# Patient Record
Sex: FEMALE | Race: WHITE | NOT HISPANIC OR LATINO | Employment: UNEMPLOYED | URBAN - METROPOLITAN AREA
[De-identification: names, ages, dates, MRNs, and addresses within clinical notes are randomized per-mention and may not be internally consistent; named-entity substitution may affect disease eponyms.]

---
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
## 2024-01-02 ENCOUNTER — TELEPHONE (OUTPATIENT)
Age: 19
End: 2024-01-02

## 2024-01-02 NOTE — TELEPHONE ENCOUNTER
Patient called as she was not given her montly apt she needs for her Accutane when she was at her [facility] appt. Advised her that I do not have anything but that I would forward a msg to the office letting them know that she needs an apt and then have someone from the office reach out to get her scheduled this month.

## 2024-01-22 ENCOUNTER — TELEMEDICINE (OUTPATIENT)
Dept: DERMATOLOGY | Facility: CLINIC | Age: 19
End: 2024-01-22
Payer: COMMERCIAL

## 2024-01-22 ENCOUNTER — TELEPHONE (OUTPATIENT)
Age: 19
End: 2024-01-22

## 2024-01-22 VITALS — HEIGHT: 63 IN | BODY MASS INDEX: 22.15 KG/M2 | WEIGHT: 125 LBS

## 2024-01-22 DIAGNOSIS — L70.0 ACNE VULGARIS: ICD-10-CM

## 2024-01-22 PROCEDURE — 99214 OFFICE O/P EST MOD 30 MIN: CPT | Performed by: DERMATOLOGY

## 2024-01-22 RX ORDER — ISOTRETINOIN 40 MG/1
CAPSULE ORAL
Qty: 30 CAPSULE | Refills: 0 | Status: SHIPPED | OUTPATIENT
Start: 2024-01-22

## 2024-01-22 NOTE — TELEPHONE ENCOUNTER
Patient called to say she had a VV apt with Dr Rasheed 12:50 keeps getting your call but there is no one on the  line when she answers. They called her again while I was on the line. Pt hung up to take the call

## 2024-01-22 NOTE — PROGRESS NOTES
Virtual Regular Visit    Verification of patient location:    Patient is located at Other in the following state in which I hold an active license [place]      Assessment/Plan:    Problem List Items Addressed This Visit    None           Reason for visit is   Chief Complaint   Patient presents with    Virtual Regular Visit          Encounter provider Tia Freedman MD    Provider located at [place] [address]  [place] [address]  [phone]      Recent Visits  No visits were found meeting these conditions.  Showing recent visits within past 7 days and meeting all other requirements  Today's Visits  Date Type Provider Dept   01/22/24 Telemedicine Tia Fredeman MD [facility]   Showing today's visits and meeting all other requirements  Future Appointments  No visits were found meeting these conditions.  Showing future appointments within next 150 days and meeting all other requirements       The patient was identified by name and date of birth. Jeri Thomas was informed that this is a telemedicine visit and that the visit is being conducted through the [employer] platform. She agrees to proceed..  My office door was closed. No one else was in the room.  She acknowledged consent and understanding of privacy and security of the video platform. The patient has agreed to participate and understands they can discontinue the visit at any time.    Patient is aware this is a billable service.     Subjective  Jeri Thomas is a 18 y.o. female Accutane follow up .      HPI     Past Medical History:   Diagnosis Date    Acne vulgaris        Past Surgical History:   Procedure Laterality Date    ADENOIDECTOMY      TONSILLECTOMY         Current Outpatient Medications   Medication Sig Dispense Refill    albuterol (Ventolin HFA) 90 mcg/act inhaler Inhale 2 puffs every 6 (six) hours as needed for wheezing      azelastine (ASTELIN) 0.1 % nasal  "spray 1 spray into each nostril 2 (two) times a day Use in each nostril as directed 30 mL 1    ISOtretinoin (ACCUTANE) 40 MG capsule Take 1 capsule (40 mg total) by mouth daily. Do NOT drink alcohol, share medication or donate blood. Do not become pregnant. Ipledge #: [id number] 30 capsule 0     No current facility-administered medications for this visit.        No Known Allergies    Review of Systems    Video Exam    Vitals:    01/22/24 1309   Weight: 56.7 kg (125 lb)   Height: 5' 3\" (1.6 m)       Physical Exam     Visit Time  Total Visit Duration 10      [facility] Note     Patient Name: Jeri Semmes  Encounter Date: 1/22/24     Have you been cared for by a [facility] Dermatologist in the last 3 years and, if so, which description applies to you?    Yes.  I have been here within the last 3 years, and my medical history has NOT changed since that time.  I am FEMALE/of child-bearing potential.    REVIEW OF SYSTEMS:  Have you recently had or currently have any of the following? No changes in my recent health.   PAST MEDICAL HISTORY:  Have you personally ever had or currently have any of the following?  If \"YES,\" then please provide more detail. No changes in my medical history.   HISTORY OF IMMUNOSUPPRESSION: Do you have a history of any of the following:  Systemic Immunosuppression such as Diabetes, Biologic or Immunotherapy, Chemotherapy, Organ Transplantation, Bone Marrow Transplantation?  No     Answering \"YES\" requires the addition of the dotphrase \"IMMUNOSUPPRESSED\" as the first diagnosis of the patient's visit.   FAMILY HISTORY:  Any \"first degree relatives\" (parent, brother, sister, or child) with the following?    No changes in my family's known health.   PATIENT EXPERIENCE:    Do you want the Dermatologist to perform a COMPLETE skin exam today including a clinical examination under the \"bra and underwear\" areas?  NO  If necessary, do we have your permission to call and leave a detailed " "message on your Preferred Phone number that includes your specific medical information?  Yes      No Known Allergies   Current Outpatient Medications:     albuterol (Ventolin HFA) 90 mcg/act inhaler, Inhale 2 puffs every 6 (six) hours as needed for wheezing, Disp: , Rfl:     azelastine (ASTELIN) 0.1 % nasal spray, 1 spray into each nostril 2 (two) times a day Use in each nostril as directed, Disp: 30 mL, Rfl: 1    ISOtretinoin (ACCUTANE) 40 MG capsule, Take 1 capsule (40 mg total) by mouth daily. Do NOT drink alcohol, share medication or donate blood. Do not become pregnant. Ipledge #: [id number], Disp: 30 capsule, Rfl: 0          Whom besides the patient is providing clinical information about today's encounter?   NO ADDITIONAL HISTORIAN (patient alone provided history)    Physical Exam and Assessment/Plan by Diagnosis:      ISOTRETINOIN \"ACCUTANE\": FEMALE    Age: 18 y.o.  Weight (in KILOgrams): 56.7 kg      Ipledge number: [id number]       Contraception Type 1: implantable hormone  Contraception Type 2: male latex condom  Patient reminded that this must be listed in same order on iPledge. Yes       \"Goal Dose\" in mg (125 mg x weight in kg): 7087.5 mg    Interim month  \"Daily Dose\" of Isotretinoin the patient has actually been taking since last visit (this is usually what was prescribed): 40 mg  \"Total # of Days\" patient took Isotretinoin since last visit (this is usually 30):  32 days  \"Total Monthly Dose\" in mg since last visit (this equals \"Daily Dose\" x \"Total # of Days\"): 1,280 mg    Cumulative dosage  \"Total cumulative Dose\" in mg (add the above \"Total Monthly Dose\" with \"Total Cumulative Dose\" from last visit: 6,890 mg        Symptoms  Conjunctivitis: No  Night Blindness/ Issues with night vision: No  Focusing Problems: No  Dry Lips/Eyes: YES, both, lips worse  Dry Skin: YES  Rash: No  Nose Bleeds: No  Angular cheilitis (cracking in corner of lips): YES  Headaches: No  Photosensitivity: No  Dry Anus: " "No  Depression: No  Mood Changes: No  Suicidal thoughts: No  Fatigue: No  Weight Loss: No  Muscle Pain/Stiffness: No  Abdominal pain: No  Diarrhea: No  Bloody stools: No    Physical Exam  Psychiatric/Mood: Normal  Anatomic Location Affected:  Face currently clear  Morphological Description:  Open/Closed Comedones:  No evidence (\"Clear\")  Inflammatory Papules/Pustules:  No evidence (\"Clear\")  Nodules:  No evidence (\"Clear\")  Scarring:  No evidence (\"Clear\")  Excoriations:  No evidence (\"Clear\")  Local Skin Redness/Erythema:  Few (\"Mild\")  Local Skin Dryness/Scaling:  Several (\"Moderate\")  Local Skin Dyspigmentation:  No evidence (\"Clear\")  Pertinent Positives:  Pertinent Negatives:    Labs  Date of last labs: 11/22/23  Completed: YES  Labs reviewed: within normal limits  Pregnancy test: urine pregnancy at home because of COVID 19  Result: negative  Interpretation- pregnant: No      Additional History of Present Condition:  Doing well this month. Acne free for about a month. Xerosis only. Denies headache, visual change, GI symptoms, mood change or depression, new muscle or bone pain. Aaron gaps in her 2 forms of birth control.    DIAGNOSES:    Acne Vulgaris  High Risk Medication  Medication Monitoring  Xerosis (see below for patient education)    Assessment and Plan:  Target Total Dose per KILOgram:  125 mg/KILOgram (this may change this on a patient-by-patient basis)  Planned daily dose for next 30 days: 40 mg  (please remember to add patient's \"Ipledge number\" to actual prescription):  [id number]  Return to clinic in about 1 month, please review ipledge guidelines in terms of timing (see below for patient education)  Get labs 1-2 days before next appointment: no  Patient confirmed in iPledge: yes  Patients counseled:  Cannot donate blood while taking isotretinoin and for 1 month after completing therapy. YES  Do not share medication with anyone. YES  Possible side effects discussed, including sun sensitivity, dry " lips/eyes/skin, headaches, blurry vision (pseudotumor cerebri), muscle/joint aches, GI upset, bloody stools (“IBD” including Crohn’s/Ulcerative Colitis), jaundice, liver dysfunction, lipid abnormalities, bone marrow dysfunction, mood effects, thoughts of hurting oneself or others, and flaring of acne (acne fulminans/SAPPHO). YES  Females cannot get pregnant and must be on two forms of birth control while on therapy and at least one month after. YES  Report any side effects of mood swings or depression and stop medication upon occurrence. YES  Patient needs to confirm counseling in iPledge prior to picking up prescription. YES      Isotretinoin for Acne   Isotretinoin, a derivative of vitamin A, is a retinoid medication that is taken by mouth to treat severe nodular acne. Typically, it is used once other acne treatments have not worked, such as oral antibiotics. Isotretinoin is powerful drug with several significant potential side effects. It used to be sold under the brand name “Accutane” but now several versions exist. Usually isotretinoin is taken for 4 to 6 months, although the length of treatment can vary from person to person.  While most patient’s acne improves and may even clear with this medication, in 20% of patients acne can come back. This requires additional acne treatment or even a second cycle of isotretinoin.     How should I take isotretinoin?   Isotretinoin dosing is weight-based and should be taken exactly as prescribed.    If you miss a dose, skip that dose. Do not take 2 doses at the same time.    Take with food to help with absorption.  All instructions in the iPLEDGE program packet (www.[employer]) that was provided must be followed (see below for highlights).   You will get a 30 day supply of isotretinoin at a time. It cannot be automatically refilled.  Make certain that you have been given enough medication to last 30 days as pharmacists are unable to refill or make changes within a  month.  You must return to your dermatologist every month to make sure you are not having any serious side effects from isotretinoin. For female patients, this visit will always include a monthly pregnancy test. Other laboratory studies, including liver function tests, cholesterol and triglycerides, must also be conducted before and during treatment.     What should I avoid while taking isotretinoin?   Do not get pregnant from one month prior or 1 month following taking any isotretinoin.   Do not donate blood while take isotretinoin or until 1 months after coming off the medication.   Do not have cosmetic procedures to smooth your skin, including waxing, dermabrasion, or laser procedures, while taking this medication and for at least 6 months after you stop.    Do not share isotretinoin with any other people. It can cause birth defects and other serious health problems.   Do not use any other acne medications, including medicated washes, cleansers, creams or antibiotic pills during your treatment with isotretinoin unless expressly directed to by your dermatologist.     Initiating isotretinoin & the iPLEDGE Program   The iPLEDGE Program is a strict, government-required program to prevent females from becoming pregnant while on isotretinoin. All females and males must participate. Note: Your provider must follow this program and cannot change any of the requirements.   Before starting isotretinoin, your provider will talk to you about the safe use of this medication and you will need to sign consent forms in order to receive treatment.    If you fail to keep appointments, you will be unable to get your prescription filled.     For females of childbearing age:      You must be on two specific forms of birth control before starting isotretinoin. Your provider must get 2 negative pregnancy tests, at least 30 days apart, before you can proceed with the medication. The second pregnancy test must be obtained within 5 days  of the menstrual cycle. If you choose not to be sexually active during treatment, you still must have the 2 negative pregnancy tests.   You must answer a series of questions either online or by phone every month prior to getting the prescription.  Monthly prescriptions must be filled within 7 days of that month's pregnancy test.  It is important that you notify your physician well before the 7th day if there are any unforeseen delays, such as prior authorizations.  For more information, visit: [email or web address]    What are the possible side effects of isotretinoin? What should I do?   Most side effects from isotretinoin are mild and can be easily improved with simple remedies.  Others are more concerning.   Dry skin and eyes, chapped lips and dry nose that may lead to nosebleeds.    Dry Skin: Apply sensitive skin moisturizers to dry skin at least two times a day. You may need sunscreen (SPF 30) in the morning and to reapply when outside.    Dry Eyes: Use saline eye drops or artificial tears.    Dry Nose/Nosebleeds: Use saline nasal spray (ex. Ayr) during the day and at bedtime. To stop nosebleeds, hold pressure.  If this does not work, call your dermatologist.    Chapped lips: apply petrolatum-based lip balms routinely. Avoid anything “medicated.” Contact your dermatologist for excessive dryness, cracks, tenderness or pain.   Increased blood fats and cholesterol (usually in patients with a personal or family history of cholesterol or triglyceride problems).    Vision problems affecting your ability to see in the dark and drive at night.   Bone, muscle and tendon aches can occur. Additional stretching before and after activities may help relieve aches.  If you are otherwise healthy, consider the use of ibuprofen or naproxen.  If you are unsure if you can use these pain medications, ask your doctor first. Also, call your doctor if you experience severe back pain, joint pain, or a  "broken bone   Changes in mood, including anxiety, depressive symptoms or suicidal thoughts which may or may not be temporary.  Notify your doctor if your or a family member have suffered from these conditions or if you have any concerns during treatment.   Serious birth defects or miscarriage can occur while taking this medication and for one month after taking your last dose of isotretinoin. You must not get pregnant while taking isotretinoin. Once the medication is out of your system, 30 days, there is no effect on the baby.   Increased pressure in the brain. Call your doctor right away if you experience bad headache, blurred vision, dizziness, nausea or vomiting, or seizures.   Skin rash - call your doctor right away if you develop any rashes or blisters on the skin.   Liver damage - call your doctor right away if you experience severe stomach, chest or bowel pain, painful swallowing, diarrhea, blood in your stool, yellowing of your skin or eyes, or dark urine.   Gastrointestinal bleeding. If you experience unusual abdominal pain or red or black/tarry stools, call your doctor immediately. You should also notify your doctor if you or a family member has a history of ulcerative colitis or Crohns disease.   Worsening acne. Mild worsening of acne can occur in the first few weeks of using isotreinoin. If your acne is getting significantly worse, call your doctor. This may require temporarily stopping the isotretinoin and possibly adding other medications.           XEROSIS (\"DRY SKIN\")    Dry skin refers to skin that feels dry to touch. Dry skin has a dull surface with a rough, scaly quality. The skin is less pliable and cracked. When dryness is severe, the skin may become inflamed and fissured.  Although any body site can be dry, dry skin tends to affect the shins more than any other site.    Dry skin is lacking moisture in the outer horny cell layer (stratum corneum) and this results in cracks in the skin " surface.  Dry skin is also called xerosis, xeroderma or asteatosis (lack of fat).  It can affect males and females of all ages. There is some racial variability in water and lipid content of the skin.  Dry skin that starts in early childhood may be one of about 20 types of ichthyosis (fish-scale skin). There is often a family history of dry skin.   Dry skin is commonly seen in people with atopic dermatitis.  Nearly everyone > 60 years has dry skin.    Dry skin that begins later may be seen in people with certain diseases and conditions.  Postmenopausal women  Hypothyroidism  Chronic renal disease   Malnutrition and weight loss   Subclinical dermatitis   Treatment with certain drugs such as oral retinoids, diuretics and epidermal growth factor receptor inhibitors    People exposed to a dry environment may experience dry skin.  Low humidity: in desert climates or cool, windy conditions   Excessive air conditioning   Direct heat from a fire or fan heater   Excessive bathing   Contact with soap, detergents and solvents   Inappropriate topical agents such as alcohol   Frictional irritation from rough clothing or abrasives    Dry skin is due to abnormalities in the integrity of the barrier function of the stratum corneum, which is made up of corneocytes.  There is an overall reduction in the lipids in the stratum corneum.   Ratio of ceramides, cholesterol and free fatty acids may be normal or altered.   There may be a reduction in the proliferation of keratinocytes.   Keratinocyte subtypes change in dry skin with a decrease in keratins K1, K10 and increase in K5, K14.   Involucrin (a protein) may be expressed early, increasing cell stiffness.   The result is retention of corneocytes and reduced water-holding capacity.    What is the treatment for dry skin?  The mainstay of treatment of dry skin and ichthyosis is moisturisers/emollients. They should be applied liberally and often enough to:  Reduce itch   Improve the  barrier function   Prevent entry of irritants, bacteria   Reduce transepidermal water loss.    When considering which emollient is most suitable, consider:  Severity of the dryness   Tolerance   Personal preference   Cost and availability.    Emollients generally work best if applied to damp skin, if pH is below 7 (acidic), and if containing humectants such as urea or propylene glycol.  Additional treatments include:  Topical steroid if itchy or there is dermatitis -- choose an emollient base   Topical calcineurin inhibitors if topical steroids are unsuitable.    How can dry skin be prevented?  Eliminate aggravating factors:  Reduce the frequency of bathing.   A humidifier in winter and air conditioner in summer   Compare having a short shower with a prolonged soak in a bath.   Use lukewarm, not hot, water.   Replace standard soap with a substitute such as a synthetic detergent cleanser, water-miscible emollient, bath oil, anti-pruritic tar oil, colloidal oatmeal etc.   Apply an emollient liberally and often, particularly shortly after bathing, and when itchy. The drier the skin, the thicker this should be, especially on the hands.    What is the outlook for dry skin?  A tendency to dry skin may persist life-long, or it may improve once contributing factors are controlled.

## 2024-02-14 ENCOUNTER — TELEPHONE (OUTPATIENT)
Age: 19
End: 2024-02-14

## 2024-02-14 NOTE — TELEPHONE ENCOUNTER
r/s from 2/21/24 Dr. Lagunas, left v/m with new appt info and asked for pt to c/b to confirm or r/s if needed

## 2024-02-22 ENCOUNTER — TELEMEDICINE (OUTPATIENT)
Dept: DERMATOLOGY | Facility: CLINIC | Age: 19
End: 2024-02-22

## 2024-02-22 DIAGNOSIS — L70.0 ACNE VULGARIS: Primary | ICD-10-CM

## 2024-02-22 DIAGNOSIS — L70.0 ACNE VULGARIS: ICD-10-CM

## 2024-02-22 NOTE — PROGRESS NOTES
Virtual Regular Visit    Verification of patient location:    Patient is located at Home in the following state in which I hold an active license [place]      Assessment/Plan:    Problem List Items Addressed This Visit    None           Reason for visit is   Chief Complaint   Patient presents with    Virtual Regular Visit          Encounter provider ZULEYMA Musa    Provider located at [facility] [place]  [address]  [address]  [place] [address] [phone]      Recent Visits  No visits were found meeting these conditions.  Showing recent visits within past 7 days and meeting all other requirements  Today's Visits  Date Type Provider Dept   02/22/24 Telemedicine ZULEYMA Musa  [facility] [place]   Showing today's visits and meeting all other requirements  Future Appointments  No visits were found meeting these conditions.  Showing future appointments within next 150 days and meeting all other requirements       The patient was identified by name and date of birth. Jeri Thomas was informed that this is a telemedicine visit and that the visit is being conducted through the Epic Embedded platform. She agrees to proceed..  My office door was closed. No one else was in the room.  She acknowledged consent and understanding of privacy and security of the video platform. The patient has agreed to participate and understands they can discontinue the visit at any time.    Patient is aware this is a billable service.     Subjective  Jeri Thomas is a 18 y.o. female with a history of acne.      HPI     Past Medical History:   Diagnosis Date    Acne vulgaris        Past Surgical History:   Procedure Laterality Date    ADENOIDECTOMY      TONSILLECTOMY         Current Outpatient Medications   Medication Sig Dispense Refill    albuterol (Ventolin HFA) 90 mcg/act inhaler Inhale 2 puffs every 6 (six) hours as needed for wheezing      azelastine (ASTELIN) 0.1 %  "nasal spray 1 spray into each nostril 2 (two) times a day Use in each nostril as directed 30 mL 1    ISOtretinoin (ACCUTANE) 40 MG capsule Take 1 capsule (40 mg total) by mouth daily. Do NOT drink alcohol, share medication or donate blood. Do not become pregnant. Ipledge #: [id number] 30 capsule 0     No current facility-administered medications for this visit.        No Known Allergies    Review of Systems    Video Exam    There were no vitals filed for this visit.    Physical Exam     Visit Time  Total Visit Duration: 15 minutes    [facility] Note     Patient Name: Jeri Newton Hamilton  Encounter Date: 2/22/2024     Have you been cared for by a [facility] D[facility]ologist in the last 3 years and, if so, which description applies to you?    Yes.  I have been here within the last 3 years, and my medical history has NOT changed since that time.  I am FEMALE/of child-bearing potential.    REVIEW OF SYSTEMS:  Have you recently had or currently have any of the following? No changes in my recent health.   PAST MEDICAL HISTORY:  Have you personally ever had or currently have any of the following?  If \"YES,\" then please provide more detail. No changes in my medical history.   HISTORY OF IMMUNOSUPPRESSION: Do you have a history of any of the following:  Systemic Immunosuppression such as Diabetes, Biologic or Immunotherapy, Chemotherapy, Organ Transplantation, Bone Marrow Transplantation?  No     Answering \"YES\" requires the addition of the dotphrase \"IMMUNOSUPPRESSED\" as the first diagnosis of the patient's visit.   FAMILY HISTORY:  Any \"first degree relatives\" (parent, brother, sister, or child) with the following?    No changes in my family's known health.   PATIENT EXPERIENCE:    If necessary, do we have your permission to call and leave a detailed message on your Preferred Phone number that includes your specific medical information?  Yes      No Known Allergies   Current Outpatient Medications:     albuterol " "(Ventolin HFA) 90 mcg/act inhaler, Inhale 2 puffs every 6 (six) hours as needed for wheezing, Disp: , Rfl:     azelastine (ASTELIN) 0.1 % nasal spray, 1 spray into each nostril 2 (two) times a day Use in each nostril as directed, Disp: 30 mL, Rfl: 1    ISOtretinoin (ACCUTANE) 40 MG capsule, Take 1 capsule (40 mg total) by mouth daily. Do NOT drink alcohol, share medication or donate blood. Do not become pregnant. Ipledge #: [id number], Disp: 30 capsule, Rfl: 0          Whom besides the patient is providing clinical information about today's encounter?   NO ADDITIONAL HISTORIAN (patient alone provided history)    Physical Exam and Assessment/Plan by Diagnosis:    ISOTRETINOIN \"ACCUTANE\": FEMALE    Age: 18 y.o.  Weight (in KILOgrams): 56.7 kg    Ipledge number: [id number]     Contraception Type 1: implantable hormone  Contraception Type 2: male latex condom  Patient reminded that this must be listed in same order on iPledge. Yes       \"Goal Dose\" in mg (125 mg x weight in kg): 7087.5 mg    Interim month  \"Daily Dose\" of Isotretinoin the patient has actually been taking since last visit (this is usually what was prescribed): 40 mg  \"Total # of Days\" patient took Isotretinoin since last visit (this is usually 30):  31 days  \"Total Monthly Dose\" in mg since last visit (this equals \"Daily Dose\" x \"Total # of Days\"): 1240 mg    Cumulative dosage  \"Total cumulative Dose\" in mg (add the above \"Total Monthly Dose\" with \"Total Cumulative Dose\" from last visit: 8130 mg        Symptoms  Conjunctivitis: YES  Night Blindness/ Issues with night vision: No  Focusing Problems: No  Dry Lips/Eyes: YES  Dry Skin: YES  Rash: No  Nose Bleeds: No  Angular cheilitis (cracking in corner of lips): YES  Headaches: No  Photosensitivity: No  Dry Anus: No  Depression: No  Mood Changes: No  Suicidal thoughts: No  Fatigue: No  Weight Loss: No  Muscle Pain/Stiffness: No  Abdominal pain: No  Diarrhea: No  Bloody stools: No    Physical " "Exam  Psychiatric/Mood: Normal  Anatomic Location Affected:  face  Morphological Description:  Open/Closed Comedones:  Rare (\"Almost Clear\")  Inflammatory Papules/Pustules:  No evidence (\"Clear\")  Nodules:  No evidence (\"Clear\")  Scarring:  Few (\"Mild\")  Excoriations:  Rare (\"Almost Clear\")  Local Skin Redness/Erythema:  Few (\"Mild\")  Local Skin Dryness/Scaling:  Few (\"Mild\")  Local Skin Dyspigmentation:  No evidence (\"Clear\")  Pertinent Positives:  Pertinent Negatives:    Labs  Date of last labs: 11/22/2023  Completed: YES  Labs reviewed: within normal limits  Pregnancy test: urine pregnancy at home because of COVID 19  Result: Patient will perform pregnancy test and send picture to confirm  Interpretation- pregnant:       Additional History of Present Condition:  Patient last evaluated via virtual visit on 1/22/24.  She reports improvement, experiencing xerosis and dry eyes.  Applies aquaphor as needed.  Tolerating medication well.    DIAGNOSES:    Acne Vulgaris  High Risk Medication  Medication Monitoring  Xerosis (see below for patient education)    Assessment and Plan:  Target Total Dose per KILOgram:  125 mg/KILOgram (this may change this on a patient-by-patient basis)  Planned daily dose for next 30 days: 40 mg.  Patient wishes to remain on same dosage  (please remember to add patient's \"Ipledge number\" to actual prescription):  [id number]   Return to clinic in about 1 month, please review ipledge guidelines in terms of timing (see below for patient education)  Get labs 1-2 days before next appointment: No  Patient confirmed in iPledge: YES, once pregnancy test results are received  Patients counseled:  Cannot donate blood while taking isotretinoin and for 1 month after completing therapy. YES  Do not share medication with anyone. YES  Possible side effects discussed, including sun sensitivity, dry lips/eyes/skin, headaches, blurry vision (pseudotumor cerebri), muscle/joint aches, GI upset, bloody stools " (“IBD” including Crohn’s/Ulcerative Colitis), jaundice, liver dysfunction, lipid abnormalities, bone marrow dysfunction, mood effects, thoughts of hurting oneself or others, and flaring of acne (acne fulminans/SAPPHO). YES  Females cannot get pregnant and must be on two forms of birth control while on therapy and at least one month after. YES  Report any side effects of mood swings or depression and stop medication upon occurrence. YES  Patient needs to confirm counseling in iPledge prior to picking up prescription. YES      Isotretinoin for Acne   Isotretinoin, a derivative of vitamin A, is a retinoid medication that is taken by mouth to treat severe nodular acne. Typically, it is used once other acne treatments have not worked, such as oral antibiotics. Isotretinoin is powerful drug with several significant potential side effects. It used to be sold under the brand name “Accutane” but now several versions exist. Usually isotretinoin is taken for 4 to 6 months, although the length of treatment can vary from person to person.  While most patient’s acne improves and may even clear with this medication, in 20% of patients acne can come back. This requires additional acne treatment or even a second cycle of isotretinoin.     How should I take isotretinoin?   Isotretinoin dosing is weight-based and should be taken exactly as prescribed.    If you miss a dose, skip that dose. Do not take 2 doses at the same time.    Take with food to help with absorption.  All instructions in the iPLEDGE program packet (www.[employer].com) that was provided must be followed (see below for highlights).   You will get a 30 day supply of isotretinoin at a time. It cannot be automatically refilled.  Make certain that you have been given enough medication to last 30 days as pharmacists are unable to refill or make changes within a month.  You must return to your dermatologist every month to make sure you are not having any serious side  effects from isotretinoin. For female patients, this visit will always include a monthly pregnancy test. Other laboratory studies, including liver function tests, cholesterol and triglycerides, must also be conducted before and during treatment.     What should I avoid while taking isotretinoin?   Do not get pregnant from one month prior or 1 month following taking any isotretinoin.   Do not donate blood while take isotretinoin or until 1 months after coming off the medication.   Do not have cosmetic procedures to smooth your skin, including waxing, dermabrasion, or laser procedures, while taking this medication and for at least 6 months after you stop.    Do not share isotretinoin with any other people. It can cause birth defects and other serious health problems.   Do not use any other acne medications, including medicated washes, cleansers, creams or antibiotic pills during your treatment with isotretinoin unless expressly directed to by your dermatologist.     Initiating isotretinoin & the iPLEDGE Program   The iPLEDGE Program is a strict, government-required program to prevent females from becoming pregnant while on isotretinoin. All females and males must participate. Note: Your provider must follow this program and cannot change any of the requirements.   Before starting isotretinoin, your provider will talk to you about the safe use of this medication and you will need to sign consent forms in order to receive treatment.    If you fail to keep appointments, you will be unable to get your prescription filled.     For females of childbearing age:      You must be on two specific forms of birth control before starting isotretinoin. Your provider must get 2 negative pregnancy tests, at least 30 days apart, before you can proceed with the medication. The second pregnancy test must be obtained within 5 days of the menstrual cycle. If you choose not to be sexually active during treatment, you still must have the 2  negative pregnancy tests.   You must answer a series of questions either online or by phone every month prior to getting the prescription.  Monthly prescriptions must be filled within 7 days of that month's pregnancy test.  It is important that you notify your physician well before the 7th day if there are any unforeseen delays, such as prior authorizations.  For more information, visit: [email or web address]    What are the possible side effects of isotretinoin? What should I do?   Most side effects from isotretinoin are mild and can be easily improved with simple remedies.  Others are more concerning.   Dry skin and eyes, chapped lips and dry nose that may lead to nosebleeds.    Dry Skin: Apply sensitive skin moisturizers to dry skin at least two times a day. You may need sunscreen (SPF 30) in the morning and to reapply when outside.    Dry Eyes: Use saline eye drops or artificial tears.    Dry Nose/Nosebleeds: Use saline nasal spray (ex. Ayr) during the day and at bedtime. To stop nosebleeds, hold pressure.  If this does not work, call your dermatologist.    Chapped lips: apply petrolatum-based lip balms routinely. Avoid anything “medicated.” Contact your dermatologist for excessive dryness, cracks, tenderness or pain.   Increased blood fats and cholesterol (usually in patients with a personal or family history of cholesterol or triglyceride problems).    Vision problems affecting your ability to see in the dark and drive at night.   Bone, muscle and tendon aches can occur. Additional stretching before and after activities may help relieve aches.  If you are otherwise healthy, consider the use of ibuprofen or naproxen.  If you are unsure if you can use these pain medications, ask your doctor first. Also, call your doctor if you experience severe back pain, joint pain, or a broken bone   Changes in mood, including anxiety, depressive symptoms or suicidal thoughts which may or may  "not be temporary.  Notify your doctor if your or a family member have suffered from these conditions or if you have any concerns during treatment.   Serious birth defects or miscarriage can occur while taking this medication and for one month after taking your last dose of isotretinoin. You must not get pregnant while taking isotretinoin. Once the medication is out of your system, 30 days, there is no effect on the baby.   Increased pressure in the brain. Call your doctor right away if you experience bad headache, blurred vision, dizziness, nausea or vomiting, or seizures.   Skin rash - call your doctor right away if you develop any rashes or blisters on the skin.   Liver damage - call your doctor right away if you experience severe stomach, chest or bowel pain, painful swallowing, diarrhea, blood in your stool, yellowing of your skin or eyes, or dark urine.   Gastrointestinal bleeding. If you experience unusual abdominal pain or red or black/tarry stools, call your doctor immediately. You should also notify your doctor if you or a family member has a history of ulcerative colitis or Crohns disease.   Worsening acne. Mild worsening of acne can occur in the first few weeks of using isotreinoin. If your acne is getting significantly worse, call your doctor. This may require temporarily stopping the isotretinoin and possibly adding other medications.           XEROSIS (\"DRY SKIN\")    Dry skin refers to skin that feels dry to touch. Dry skin has a dull surface with a rough, scaly quality. The skin is less pliable and cracked. When dryness is severe, the skin may become inflamed and fissured.  Although any body site can be dry, dry skin tends to affect the shins more than any other site.    Dry skin is lacking moisture in the outer horny cell layer (stratum corneum) and this results in cracks in the skin surface.  Dry skin is also called xerosis, xeroderma or asteatosis (lack of fat).  It can affect males and females of " all ages. There is some racial variability in water and lipid content of the skin.  Dry skin that starts in early childhood may be one of about 20 types of ichthyosis (fish-scale skin). There is often a family history of dry skin.   Dry skin is commonly seen in people with atopic dermatitis.  Nearly everyone > 60 years has dry skin.    Dry skin that begins later may be seen in people with certain diseases and conditions.  Postmenopausal women  Hypothyroidism  Chronic renal disease   Malnutrition and weight loss   Subclinical dermatitis   Treatment with certain drugs such as oral retinoids, diuretics and epidermal growth factor receptor inhibitors    People exposed to a dry environment may experience dry skin.  Low humidity: in desert climates or cool, windy conditions   Excessive air conditioning   Direct heat from a fire or fan heater   Excessive bathing   Contact with soap, detergents and solvents   Inappropriate topical agents such as alcohol   Frictional irritation from rough clothing or abrasives    Dry skin is due to abnormalities in the integrity of the barrier function of the stratum corneum, which is made up of corneocytes.  There is an overall reduction in the lipids in the stratum corneum.   Ratio of ceramides, cholesterol and free fatty acids may be normal or altered.   There may be a reduction in the proliferation of keratinocytes.   Keratinocyte subtypes change in dry skin with a decrease in keratins K1, K10 and increase in K5, K14.   Involucrin (a protein) may be expressed early, increasing cell stiffness.   The result is retention of corneocytes and reduced water-holding capacity.    What is the treatment for dry skin?  The mainstay of treatment of dry skin and ichthyosis is moisturisers/emollients. They should be applied liberally and often enough to:  Reduce itch   Improve the barrier function   Prevent entry of irritants, bacteria   Reduce transepidermal water loss.    When considering which  emollient is most suitable, consider:  Severity of the dryness   Tolerance   Personal preference   Cost and availability.    Emollients generally work best if applied to damp skin, if pH is below 7 (acidic), and if containing humectants such as urea or propylene glycol.  Additional treatments include:  Topical steroid if itchy or there is dermatitis -- choose an emollient base   Topical calcineurin inhibitors if topical steroids are unsuitable.    How can dry skin be prevented?  Eliminate aggravating factors:  Reduce the frequency of bathing.   A humidifier in winter and air conditioner in summer   Compare having a short shower with a prolonged soak in a bath.   Use lukewarm, not hot, water.   Replace standard soap with a substitute such as a synthetic detergent cleanser, water-miscible emollient, bath oil, anti-pruritic tar oil, colloidal oatmeal etc.   Apply an emollient liberally and often, particularly shortly after bathing, and when itchy. The drier the skin, the thicker this should be, especially on the hands.    What is the outlook for dry skin?  A tendency to dry skin may persist life-long, or it may improve once contributing factors are controlled.

## 2024-02-23 RX ORDER — ISOTRETINOIN 40 MG/1
CAPSULE ORAL
Qty: 30 CAPSULE | Refills: 0 | Status: SHIPPED | OUTPATIENT
Start: 2024-02-23

## 2024-02-23 NOTE — TELEPHONE ENCOUNTER
Received confirmation of negative pregnancy test on 2/22/24.  Patient confirmed in iPLEDGE by Dr. Hernandez.  Refill sent to pharmacy.

## 2024-03-25 ENCOUNTER — TELEMEDICINE (OUTPATIENT)
Dept: DERMATOLOGY | Facility: CLINIC | Age: 19
End: 2024-03-25
Payer: COMMERCIAL

## 2024-03-25 VITALS — HEIGHT: 63 IN | BODY MASS INDEX: 23.04 KG/M2 | WEIGHT: 130 LBS

## 2024-03-25 DIAGNOSIS — L70.0 ACNE VULGARIS: Primary | ICD-10-CM

## 2024-03-25 PROCEDURE — 99213 OFFICE O/P EST LOW 20 MIN: CPT | Performed by: DERMATOLOGY

## 2024-03-25 NOTE — PROGRESS NOTES
Virtual Regular Visit    Verification of patient location:    Patient is located at Home in the following state in which I hold an active license [place]      Assessment/Plan:    Problem List Items Addressed This Visit    None           Reason for visit is   Chief Complaint   Patient presents with    Virtual Regular Visit          Encounter provider Tia Freedman MD    Provider located at [place] [address]  [place] [address]  [phone]      Recent Visits  No visits were found meeting these conditions.  Showing recent visits within past 7 days and meeting all other requirements  Today's Visits  Date Type Provider Dept   03/25/24 Telemedicine Tia Freedman MD [facility]   Showing today's visits and meeting all other requirements  Future Appointments  No visits were found meeting these conditions.  Showing future appointments within next 150 days and meeting all other requirements       The patient was identified by name and date of birth. Jeri Thomas was informed that this is a telemedicine visit and that the visit is being conducted through the [employer] platform. She agrees to proceed..  My office door was closed. No one else was in the room.  She acknowledged consent and understanding of privacy and security of the video platform. The patient has agreed to participate and understands they can discontinue the visit at any time.    Patient is aware this is a billable service.     Subjective  Jeri Thomas is a 18 y.o. female  .      HPI     Past Medical History:   Diagnosis Date    Acne vulgaris        Past Surgical History:   Procedure Laterality Date    ADENOIDECTOMY      TONSILLECTOMY         Current Outpatient Medications   Medication Sig Dispense Refill    ISOtretinoin (ACCUTANE) 40 MG capsule Take 1 capsule (40 mg total) by mouth daily. Do NOT drink alcohol, share medication or donate blood. Do not become pregnant.  "Ipledge #: [id number] 30 capsule 0    albuterol (Ventolin HFA) 90 mcg/act inhaler Inhale 2 puffs every 6 (six) hours as needed for wheezing      azelastine (ASTELIN) 0.1 % nasal spray 1 spray into each nostril 2 (two) times a day Use in each nostril as directed 30 mL 1     No current facility-administered medications for this visit.        No Known Allergies    Review of Systems    Video Exam    There were no vitals filed for this visit.    Physical Exam     Visit Time  Total Visit Duration: 15[facility] D[facility]at[facility] Note     Patient Name: Jeri Tempe  Encounter Date: 3/25/24     Have you been cared for by a [facility] Dermatologist in the last 3 years and, if so, which description applies to you?    Yes.  I have been here within the last 3 years, and my medical history has NOT changed since that time.  I am FEMALE/of child-bearing potential.    REVIEW OF SYSTEMS:  Have you recently had or currently have any of the following? No changes in my recent health.   PAST MEDICAL HISTORY:  Have you personally ever had or currently have any of the following?  If \"YES,\" then please provide more detail. No changes in my medical history.   HISTORY OF IMMUNOSUPPRESSION: Do you have a history of any of the following:  Systemic Immunosuppression such as Diabetes, Biologic or Immunotherapy, Chemotherapy, Organ Transplantation, Bone Marrow Transplantation?  No     Answering \"YES\" requires the addition of the dotphrase \"IMMUNOSUPPRESSED\" as the first diagnosis of the patient's visit.   FAMILY HISTORY:  Any \"first degree relatives\" (parent, brother, sister, or child) with the following?    No changes in my family's known health.   PATIENT EXPERIENCE:    Do you want the Dermatologist to perform a COMPLETE skin exam today including a clinical examination under the \"bra and underwear\" areas?  NO  If necessary, do we have your permission to call and leave a detailed message on your Preferred Phone number that includes your " "specific medical information?  Yes      No Known Allergies   Current Outpatient Medications:     ISOtretinoin (ACCUTANE) 40 MG capsule, Take 1 capsule (40 mg total) by mouth daily. Do NOT drink alcohol, share medication or donate blood. Do not become pregnant. Ipledge #: [id number], Disp: 30 capsule, Rfl: 0    albuterol (Ventolin HFA) 90 mcg/act inhaler, Inhale 2 puffs every 6 (six) hours as needed for wheezing, Disp: , Rfl:     azelastine (ASTELIN) 0.1 % nasal spray, 1 spray into each nostril 2 (two) times a day Use in each nostril as directed, Disp: 30 mL, Rfl: 1          Whom besides the patient is providing clinical information about today's encounter?   NO ADDITIONAL HISTORIAN (patient alone provided history)    Physical Exam and Assessment/Plan by Diagnosis:        ISOTRETINOIN \"ACCUTANE\": FEMALE     Age: 18 y.o.  Weight (in KILOgrams): 59 kg     Ipledge number: [id number]      Contraception Type 1: implantable hormone  Contraception Type 2: male latex condom  Patient reminded that this must be listed in same order on iPledge. Yes         \"Goal Dose\" in mg (125 mg x weight in kg): 7087.5 mg      Interim month  \"Daily Dose\" of Isotretinoin the patient has actually been taking since last visit (this is usually what was prescribed): 40 mg  \"Total # of Days\" patient took Isotretinoin since last visit (this is usually 30):  30  \"Total Monthly Dose\" in mg since last visit (this equals \"Daily Dose\" x \"Total # of Days\"): 1200     Cumulative dosage  \"Total cumulative Dose\" in mg (add the above \"Total Monthly Dose\" with \"Total Cumulative Dose\" from last visit: 9,330 mg           Symptoms  Conjunctivitis: no  Night Blindness/ Issues with night vision: No  Focusing Problems: No  Dry Lips/Eyes: YES  Dry Skin: YES  Rash: No  Nose Bleeds: No  Angular cheilitis (cracking in corner of lips): YES  Headaches: No  Photosensitivity: No  Dry Anus: No  Depression: No  Mood Changes: No  Suicidal thoughts: No  Fatigue: No  Weight " "Loss: No  Muscle Pain/Stiffness: No  Abdominal pain: No  Diarrhea: No  Bloody stools: No     Physical Exam  Psychiatric/Mood: Normal  Anatomic Location Affected:  face  Morphological Description:  Open/Closed Comedones:  None  Inflammatory Papules/Pustules:  No evidence (\"Clear\")  Nodules:  No evidence (\"Clear\")  Scarring:  Few (\"Mild\")  Excoriations:  Rare (\"Almost Clear\")  Local Skin Redness/Erythema:  Few (\"Mild\")  Local Skin Dryness/Scaling:  Few (\"Mild\")  Local Skin Dyspigmentation:  No evidence (\"Clear\")  Pertinent Positives:  Pertinent Negatives:     Labs  Date of last labs: 11/22/2023  Completed: YES  Labs reviewed: within normal limits  Pregnancy test PENDING: urine pregnancy at home because of COVID 19  Result: Patient will perform pregnancy test and send picture to confirm          Additional History of Present Condition:  Patient last evaluated via virtual visit on 2/22/24.  She has not had a new lesion in over a month and is past her goal dose. Denies headache, visual change, depression, abd pain, n/v/d. She is very happy with results. She denies gaps in her birth control (skin implant and condoms)     DIAGNOSES:       Acne Vulgaris  High Risk Medication  Medication Monitoring  Xerosis (see below for patient education)     Assessment and Plan: acne free for >1 month and comfortably over goal dose range. Will STOP Accutane. Patient to send photo of pregnancy test now, and again in 30 days to complete Ipledge requirements.  Target Total Dose per KILOgram:  125 mg/KILOgram (this may change this on a patient-by-patient basis)  Planned daily dose for next 30 days: 0 mg  (please remember to add patient's \"Ipledge number\" to actual prescription):  [id number]     Patient discontinued in iPledge: Will complete when pregnancy test results are received  Patients counseled:  Cannot donate blood while taking isotretinoin and for 1 month after completing therapy. YES  Do not share medication with anyone. " YES  Possible side effects discussed, including sun sensitivity, dry lips/eyes/skin, headaches, blurry vision (pseudotumor cerebri), muscle/joint aches, GI upset, bloody stools (“IBD” including Crohn’s/Ulcerative Colitis), jaundice, liver dysfunction, lipid abnormalities, bone marrow dysfunction, mood effects, thoughts of hurting oneself or others, and flaring of acne (acne fulminans/SAPPHO). YES  Females cannot get pregnant and must be on two forms of birth control while on therapy and at least one month after. YES  Report any side effects of mood swings or depression and stop medication upon occurrence. YES          Isotretinoin for Acne   Isotretinoin, a derivative of vitamin A, is a retinoid medication that is taken by mouth to treat severe nodular acne. Typically, it is used once other acne treatments have not worked, such as oral antibiotics. Isotretinoin is powerful drug with several significant potential side effects. It used to be sold under the brand name “Accutane” but now several versions exist. Usually isotretinoin is taken for 4 to 6 months, although the length of treatment can vary from person to person.  While most patient’s acne improves and may even clear with this medication, in 20% of patients acne can come back. This requires additional acne treatment or even a second cycle of isotretinoin.      How should I take isotretinoin?   Isotretinoin dosing is weight-based and should be taken exactly as prescribed.    If you miss a dose, skip that dose. Do not take 2 doses at the same time.    Take with food to help with absorption.  All instructions in the iPLEDGE program packet (www.[employer]) that was provided must be followed (see below for highlights).   You will get a 30 day supply of isotretinoin at a time. It cannot be automatically refilled.  Make certain that you have been given enough medication to last 30 days as pharmacists are unable to refill or make changes within a month.  You  must return to your dermatologist every month to make sure you are not having any serious side effects from isotretinoin. For female patients, this visit will always include a monthly pregnancy test. Other laboratory studies, including liver function tests, cholesterol and triglycerides, must also be conducted before and during treatment.      What should I avoid while taking isotretinoin?   Do not get pregnant from one month prior or 1 month following taking any isotretinoin.   Do not donate blood while take isotretinoin or until 1 months after coming off the medication.   Do not have cosmetic procedures to smooth your skin, including waxing, dermabrasion, or laser procedures, while taking this medication and for at least 6 months after you stop.    Do not share isotretinoin with any other people. It can cause birth defects and other serious health problems.   Do not use any other acne medications, including medicated washes, cleansers, creams or antibiotic pills during your treatment with isotretinoin unless expressly directed to by your dermatologist.      Initiating isotretinoin & the iPLEDGE Program   The iPLEDGE Program is a strict, government-required program to prevent females from becoming pregnant while on isotretinoin. All females and males must participate. Note: Your provider must follow this program and cannot change any of the requirements.   Before starting isotretinoin, your provider will talk to you about the safe use of this medication and you will need to sign consent forms in order to receive treatment.    If you fail to keep appointments, you will be unable to get your prescription filled.     For females of childbearing age:      You must be on two specific forms of birth control before starting isotretinoin. Your provider must get 2 negative pregnancy tests, at least 30 days apart, before you can proceed with the medication. The second pregnancy test must be obtained within 5 days of the  menstrual cycle. If you choose not to be sexually active during treatment, you still must have the 2 negative pregnancy tests.   You must answer a series of questions either online or by phone every month prior to getting the prescription.  Monthly prescriptions must be filled within 7 days of that month's pregnancy test.  It is important that you notify your physician well before the 7th day if there are any unforeseen delays, such as prior authorizations.  For more information, visit: [email or web address]     What are the possible side effects of isotretinoin? What should I do?   Most side effects from isotretinoin are mild and can be easily improved with simple remedies.  Others are more concerning.   Dry skin and eyes, chapped lips and dry nose that may lead to nosebleeds.    Dry Skin: Apply sensitive skin moisturizers to dry skin at least two times a day. You may need sunscreen (SPF 30) in the morning and to reapply when outside.    Dry Eyes: Use saline eye drops or artificial tears.    Dry Nose/Nosebleeds: Use saline nasal spray (ex. Ayr) during the day and at bedtime. To stop nosebleeds, hold pressure.  If this does not work, call your dermatologist.    Chapped lips: apply petrolatum-based lip balms routinely. Avoid anything “medicated.” Contact your dermatologist for excessive dryness, cracks, tenderness or pain.   Increased blood fats and cholesterol (usually in patients with a personal or family history of cholesterol or triglyceride problems).    Vision problems affecting your ability to see in the dark and drive at night.   Bone, muscle and tendon aches can occur. Additional stretching before and after activities may help relieve aches.  If you are otherwise healthy, consider the use of ibuprofen or naproxen.  If you are unsure if you can use these pain medications, ask your doctor first. Also, call your doctor if you experience severe back pain, joint pain, or a broken  "bone   Changes in mood, including anxiety, depressive symptoms or suicidal thoughts which may or may not be temporary.  Notify your doctor if your or a family member have suffered from these conditions or if you have any concerns during treatment.   Serious birth defects or miscarriage can occur while taking this medication and for one month after taking your last dose of isotretinoin. You must not get pregnant while taking isotretinoin. Once the medication is out of your system, 30 days, there is no effect on the baby.   Increased pressure in the brain. Call your doctor right away if you experience bad headache, blurred vision, dizziness, nausea or vomiting, or seizures.   Skin rash - call your doctor right away if you develop any rashes or blisters on the skin.   Liver damage - call your doctor right away if you experience severe stomach, chest or bowel pain, painful swallowing, diarrhea, blood in your stool, yellowing of your skin or eyes, or dark urine.   Gastrointestinal bleeding. If you experience unusual abdominal pain or red or black/tarry stools, call your doctor immediately. You should also notify your doctor if you or a family member has a history of ulcerative colitis or Crohns disease.   Worsening acne. Mild worsening of acne can occur in the first few weeks of using isotreinoin. If your acne is getting significantly worse, call your doctor. This may require temporarily stopping the isotretinoin and possibly adding other medications.              XEROSIS (\"DRY SKIN\")     Dry skin refers to skin that feels dry to touch. Dry skin has a dull surface with a rough, scaly quality. The skin is less pliable and cracked. When dryness is severe, the skin may become inflamed and fissured.  Although any body site can be dry, dry skin tends to affect the shins more than any other site.     Dry skin is lacking moisture in the outer horny cell layer (stratum corneum) and this results in cracks in the skin " surface.  Dry skin is also called xerosis, xeroderma or asteatosis (lack of fat).  It can affect males and females of all ages. There is some racial variability in water and lipid content of the skin.  Dry skin that starts in early childhood may be one of about 20 types of ichthyosis (fish-scale skin). There is often a family history of dry skin.   Dry skin is commonly seen in people with atopic dermatitis.  Nearly everyone > 60 years has dry skin.     Dry skin that begins later may be seen in people with certain diseases and conditions.  Postmenopausal women  Hypothyroidism  Chronic renal disease   Malnutrition and weight loss   Subclinical dermatitis   Treatment with certain drugs such as oral retinoids, diuretics and epidermal growth factor receptor inhibitors     People exposed to a dry environment may experience dry skin.  Low humidity: in desert climates or cool, windy conditions   Excessive air conditioning   Direct heat from a fire or fan heater   Excessive bathing   Contact with soap, detergents and solvents   Inappropriate topical agents such as alcohol   Frictional irritation from rough clothing or abrasives     Dry skin is due to abnormalities in the integrity of the barrier function of the stratum corneum, which is made up of corneocytes.  There is an overall reduction in the lipids in the stratum corneum.   Ratio of ceramides, cholesterol and free fatty acids may be normal or altered.   There may be a reduction in the proliferation of keratinocytes.   Keratinocyte subtypes change in dry skin with a decrease in keratins K1, K10 and increase in K5, K14.   Involucrin (a protein) may be expressed early, increasing cell stiffness.   The result is retention of corneocytes and reduced water-holding capacity.     What is the treatment for dry skin?  The mainstay of treatment of dry skin and ichthyosis is moisturisers/emollients. They should be applied liberally and often enough to:  Reduce itch   Improve the  barrier function   Prevent entry of irritants, bacteria   Reduce transepidermal water loss.     When considering which emollient is most suitable, consider:  Severity of the dryness   Tolerance   Personal preference   Cost and availability.     Emollients generally work best if applied to damp skin, if pH is below 7 (acidic), and if containing humectants such as urea or propylene glycol.  Additional treatments include:  Topical steroid if itchy or there is dermatitis -- choose an emollient base   Topical calcineurin inhibitors if topical steroids are unsuitable.     How can dry skin be prevented?  Eliminate aggravating factors:  Reduce the frequency of bathing.   A humidifier in winter and air conditioner in summer   Compare having a short shower with a prolonged soak in a bath.   Use lukewarm, not hot, water.   Replace standard soap with a substitute such as a synthetic detergent cleanser, water-miscible emollient, bath oil, anti-pruritic tar oil, colloidal oatmeal etc.   Apply an emollient liberally and often, particularly shortly after bathing, and when itchy. The drier the skin, the thicker this should be, especially on the hands.     What is the outlook for dry skin?  A tendency to dry skin may persist life-long, or it may improve once contributing factors are controlled.     Scribe Attestation      I,:  Jessica Mckeon MA am acting as a scribe while in the presence of the attending physician.:       I,:  Tia Freedman MD personally performed the services described in this documentation    as scribed in my presence.:

## 2024-07-12 ENCOUNTER — TELEPHONE (OUTPATIENT)
Age: 19
End: 2024-07-12

## 2024-07-12 NOTE — TELEPHONE ENCOUNTER
Pt's mom Willa calling to schedule FU for accutane    Offered next week in-person visit Tues/Wed/Thurs at [place] office with an AP    Willa stated she needs to check with pt to find out which day would work best for her    She will call back to schedule

## 2024-07-16 ENCOUNTER — OFFICE VISIT (OUTPATIENT)
Age: 19
End: 2024-07-16
Payer: COMMERCIAL

## 2024-07-16 VITALS — HEIGHT: 63 IN | TEMPERATURE: 98.3 F | BODY MASS INDEX: 22.68 KG/M2 | WEIGHT: 128 LBS

## 2024-07-16 DIAGNOSIS — L70.0 ACNE VULGARIS: Primary | ICD-10-CM

## 2024-07-16 DIAGNOSIS — L21.9 SEBORRHEIC DERMATITIS: ICD-10-CM

## 2024-07-16 PROCEDURE — 99214 OFFICE O/P EST MOD 30 MIN: CPT | Performed by: NURSE PRACTITIONER

## 2024-07-16 RX ORDER — KETOCONAZOLE 20 MG/ML
1 SHAMPOO TOPICAL 2 TIMES WEEKLY
Qty: 1 ML | Refills: 2 | Status: CANCELLED | OUTPATIENT
Start: 2024-07-18

## 2024-07-16 RX ORDER — KETOCONAZOLE 20 MG/ML
SHAMPOO TOPICAL
Qty: 100 ML | Refills: 3 | Status: SHIPPED | OUTPATIENT
Start: 2024-07-16

## 2024-07-16 NOTE — PATIENT INSTRUCTIONS
"SEBORRHEIC DERMATITIS    Assessment and Plan:  Based on a thorough discussion of this condition and the management approach to it (including a comprehensive discussion of the known risks, side effects and potential benefits of treatment), the patient (family) agrees to implement the following specific plan:  Start ketoconazole shampoo twice a week, apply to scalp and let it sit for five minutes and then rinse.       Seborrheic Dermatitis   Seborrheic dermatitis is a common, chronic or relapsing form of eczema/dermatitis that mainly affects the sebaceous, gland-rich regions of the scalp, face, and trunk.  There are infantile and adult forms of seborrhoeic dermatitis. It is sometimes associated with psoriasis and, in that clinical scenario, may be referred to as \"sebo-psoriasis.\"  Seborrheic dermatitis is also known as \"seborrheic eczema.\"  Dandruff (also called \"pityriasis capitis\") is an uninflamed form of seborrhoeic dermatitis. Dandruff presents as bran-like scaly patches scattered within hair-bearing areas of the scalp.  In an infant, this condition may be referred to as \"cradle cap.\"  The cause of seborrheic dermatitis is not completely understood. It is associated with proliferation of various species of the skin commensal Malassezia, in its yeast (non-pathogenic) form. Its metabolites (such as the fatty acids oleic acid, malssezin, and indole-3-carbaldehyde) may cause an inflammatory reaction. Differences in skin barrier lipid content and function may account for individual presentations.    Infantile Seborrheic Dermatitis  Infantile seborrheic dermatitis affects babies under the age of 3 months and usually resolves by 6-12 months of age.  Infantile seborrheic dermatitis causes \"cradle cap\" (diffuse, greasy scaling on scalp). The rash may spread to affect armpit and groin folds (a type of \"napkin dermatitis\").  There may be associated salmon-pink colored patches that may flake or peel.  The rash in this case " is usually not especially itchy, so the baby often appears undisturbed by the rash, even when more generalized.    Adult Seborrheic Dermatitis  Adult seborrheic dermatitis tends to begin in late adolescence; prevalence is greatest in young adults and in the elderly. It is more common in males than in females.    The following factors are sometimes associated with severe adult seborrheic dermatitis:  Oily skin  Familial tendency to seborrhoeic dermatitis or a family history of psoriasis  Immunosuppression: organ transplant recipient, human immunodeficiency virus (HIV) infection and patients with lymphoma  Neurological and psychiatric diseases: Parkinson disease, tardive dyskinesia, depression, epilepsy, facial nerve palsy, spinal cord injury and congenital disorders such as Down syndrome  Treatment for psoriasis with psoralen and ultraviolet A (PUVA) therapy  Lack of sleep  Stressful events.    In adults, seborrheic dermatitis may typically affect the scalp, face (creases around the nose, behind ears, within eyebrows) and upper trunk. Typical clinical features include:  Winter flares, improving in summer following sun exposure  Minimal itch most of the time  Combination oily and dry mid-facial skin  Ill-defined localized scaly patches or diffuse scale in the scalp  Blepharitis; scaly red eyelid margins  [place]-pink, thin, scaly, and ill-defined plaques in skin folds on both sides of the face  Petal or ring-shaped flaky patches on hair-line and on anterior chest  Rash in armpits, under the breasts, in the groin folds and genital creases  Superficial folliculitis (inflamed hair follicles) on cheeks and upper trunk    Seborrheic dermatitis is diagnosed by its clinical appearance and behavior. Skin biopsy may be helpful but is rarely necessary to make this diagnosis.

## 2024-07-16 NOTE — PROGRESS NOTES
"[facility] Dermatology Clinic Note     Patient Name: Jeri Thomas  Encounter Date: 07/16/2024     Have you been cared for by a [facility] Dermatologist in the last 3 years and, if so, which description applies to you?    Yes.  I have been here within the last 3 years, and my medical history has NOT changed since that time.  I am FEMALE/of child-bearing potential.    REVIEW OF SYSTEMS:  Have you recently had or currently have any of the following? No changes in my recent health.   PAST MEDICAL HISTORY:  Have you personally ever had or currently have any of the following?  If \"YES,\" then please provide more detail. No changes in my medical history.   HISTORY OF IMMUNOSUPPRESSION: Do you have a history of any of the following:  Systemic Immunosuppression such as Diabetes, Biologic or Immunotherapy, Chemotherapy, Organ Transplantation, Bone Marrow Transplantation?  No     Answering \"YES\" requires the addition of the dotphrase \"IMMUNOSUPPRESSED\" as the first diagnosis of the patient's visit.   FAMILY HISTORY:  Any \"first degree relatives\" (parent, brother, sister, or child) with the following?    No changes in my family's known health.   PATIENT EXPERIENCE:    Do you want the Dermatologist to perform a COMPLETE skin exam today including a clinical examination under the \"bra and underwear\" areas?  NO  If necessary, do we have your permission to call and leave a detailed message on your Preferred Phone number that includes your specific medical information?  Yes      No Known Allergies   Current Outpatient Medications:     ISOtretinoin (ACCUTANE) 40 MG capsule, Take 1 capsule (40 mg total) by mouth daily. Do NOT drink alcohol, share medication or donate blood. Do not become pregnant. Ipledge #: [id number], Disp: 30 capsule, Rfl: 0    albuterol (Ventolin HFA) 90 mcg/act inhaler, Inhale 2 puffs every 6 (six) hours as needed for wheezing, Disp: , Rfl:     azelastine (ASTELIN) 0.1 % nasal spray, 1 spray into each nostril 2 " "(two) times a day Use in each nostril as directed, Disp: 30 mL, Rfl: 1          Whom besides the patient is providing clinical information about today's encounter?   NO ADDITIONAL HISTORIAN (patient alone provided history)    Physical Exam and Assessment/Plan by Diagnosis:      ACNE VULGARIS    Physical Exam:  Anatomic Locations Involved: Face  Global Assessment: CLEAR:  Normal, clear skin.  Scarring Present? NONE  Pertinent Positives:  Pertinent Negatives:    Additional History of Present Condition:  Patient last evaluated via telemedicine visit by Dr. Freedman on 3/25/24.  She completed Accutane treatment as she had met her goal dose and been acne free.  Urine pregnancy test from 3/25/24 was negative.  Patient currently using Cetaphil sensitive skin face wash and adapalene 0.03% gel every other night.  She reports having a few whiteheads since stopping Accutane, but no nodules or cysts.  Patient has an implantable hormone device as birth control.     TODAY'S PLAN:     PRESCRIPTION MANAGEMENT:  Several treatment options were discussed including topical retinoids and their side effects.     Skin Hygiene:      Wash affected areas (face, chest, and back) TWICE A DAY with a mild cleanser such as Cerave or Cetaphil.  Use only mild cleansers (hypoallergenic and without fragrances) and fragrance free detergent (not \"unscented\" products which contain a masking agent); we discussed avoiding irritants/fragranced products.  Apply a good oil-free facial moisturizer AT LEAST TWO TIMES A DAY \" such as Cerave or Cetaphil.  Minimize the application of oils and cosmetics to the affected skin.  This includes HAIR PRODUCTS such as \"leave in\" conditioners.  Unless the product specifically states that it \"won't cause acne,\" \"won't clog pores,\" and/or \"is non-comedogenic\" then it may actually CAUSE acne.  If you smoke, STOP. Nicotine increases sebum retention and increased scale within the follicles, forming comedones (blackheads and " "whiteheads).  Abrasive treatments such as dermabrasion and spa facials may aggravate inflammatory acne.  Do NOT scratch or pick your acne bumps.  The evidence that diet directly affects acne remains weak.  However, diet does affect your overall health.  Eat plenty of fresh fruit and vegetables.  Avoid protein or amino acid supplements, particularly if they contain leucine. Consider a low-glycemic, low-protein and low-dairy diet.  Be mindful that certain medications may cause of aggravate acne.  Make sure to tell your Dermatologist if you start a new prescription, nutritional supplement, and/or herbal remedy.      MORNING Topical Regimen:      NONE.      EVENING Topical Regimen:      Adapalene 0.3% gel AT LEAST 1 HOUR BEFORE BEDTIME:  Evenly spread a SINGLE pea-sized amount of this medication over your entire face, avoiding the eyes and corners of the mouth.      SYSTEMIC Strategies:      OTHER:  Nexplanon      Follow up in 1 year or sooner if needed   MEDICAL DECISION MAKING  Treatment Goal:  Resolution of the CHRONIC condition.       Chronic condition is currently at treatment goal.          SEBORRHEIC DERMATITIS    Physical Exam:  Anatomic Location: scalp  Morphologic Description:  Erythematous plaques with greasy scale  Pertinent Positives:  Pertinent Negatives:    Additional History of Present Condition:  Patient reports itchy, flaky scalp.  Patient states mother has history of psoriasis.  She denies any dry patches.      Plan:  Ketoconazole 2% shampoo: Apply to affected area daily for 5 to 10 minutes, then rinse clear.    Medical Complexity:    CHRONIC ILLNESS (expected duration of >1 year):  STABLE (i.e., AT TREATMENT GOAL). \"Stable\" refers to treatment goals for the individual patient.  A patient not at treatment goal is NOT stable even if the condition is not changing and the patient is asymptomatic because the risk of morbidity without treatment is significant.        Scribe Attestation      I,:  Patricia MARIO" Son am acting as a scribe while in the presence of the attending physician.:       I,:  ZULEYMA Musa personally performed the services described in this documentation    as scribed in my presence.:

## 2024-07-19 ENCOUNTER — LAB (OUTPATIENT)
Dept: LAB | Facility: HOSPITAL | Age: 19
End: 2024-07-19
Payer: COMMERCIAL

## 2024-07-19 DIAGNOSIS — L70.0 ACNE VULGARIS: ICD-10-CM

## 2024-07-19 LAB
ALT SERPL W P-5'-P-CCNC: 12 U/L (ref 7–52)
HCG SERPL QL: NEGATIVE
TRIGL SERPL-MCNC: 90 MG/DL

## 2024-07-19 PROCEDURE — 36415 COLL VENOUS BLD VENIPUNCTURE: CPT

## 2024-07-19 PROCEDURE — 84703 CHORIONIC GONADOTROPIN ASSAY: CPT

## 2024-07-19 PROCEDURE — 84460 ALANINE AMINO (ALT) (SGPT): CPT

## 2024-07-19 PROCEDURE — 84478 ASSAY OF TRIGLYCERIDES: CPT

## 2024-07-22 ENCOUNTER — TELEPHONE (OUTPATIENT)
Age: 19
End: 2024-07-22

## 2024-07-22 NOTE — TELEPHONE ENCOUNTER
----- Message from ZULEYMA Denney sent at 7/19/2024  1:02 PM EDT -----  Please inform patient of normal lab results and negative pregnancy test.

## 2024-12-31 DIAGNOSIS — L21.9 SEBORRHEIC DERMATITIS: ICD-10-CM

## 2025-01-02 RX ORDER — KETOCONAZOLE 20 MG/ML
SHAMPOO, SUSPENSION TOPICAL
Qty: 100 ML | Refills: 0 | Status: SHIPPED | OUTPATIENT
Start: 2025-01-02

## 2025-01-03 ENCOUNTER — PATIENT MESSAGE (OUTPATIENT)
Dept: DERMATOLOGY | Facility: CLINIC | Age: 20
End: 2025-01-03

## 2025-01-03 ENCOUNTER — APPOINTMENT (OUTPATIENT)
Dept: LAB | Facility: CLINIC | Age: 20
End: 2025-01-03
Payer: COMMERCIAL

## 2025-01-03 ENCOUNTER — OFFICE VISIT (OUTPATIENT)
Dept: DERMATOLOGY | Facility: CLINIC | Age: 20
End: 2025-01-03
Payer: COMMERCIAL

## 2025-01-03 VITALS — TEMPERATURE: 97.4 F | BODY MASS INDEX: 22.14 KG/M2 | WEIGHT: 125 LBS

## 2025-01-03 DIAGNOSIS — L70.0 ACNE VULGARIS: Primary | ICD-10-CM

## 2025-01-03 DIAGNOSIS — Z79.899 HIGH RISK MEDICATION USE: ICD-10-CM

## 2025-01-03 LAB
ALBUMIN SERPL BCG-MCNC: 4.8 G/DL (ref 3.5–5)
ALP SERPL-CCNC: 94 U/L (ref 34–104)
ALT SERPL W P-5'-P-CCNC: 11 U/L (ref 7–52)
ANION GAP SERPL CALCULATED.3IONS-SCNC: 7 MMOL/L (ref 4–13)
AST SERPL W P-5'-P-CCNC: 17 U/L (ref 13–39)
BASOPHILS # BLD AUTO: 0.03 THOUSANDS/ΜL (ref 0–0.1)
BASOPHILS NFR BLD AUTO: 1 % (ref 0–1)
BILIRUB DIRECT SERPL-MCNC: 0.13 MG/DL (ref 0–0.2)
BILIRUB SERPL-MCNC: 0.93 MG/DL (ref 0.2–1)
BUN SERPL-MCNC: 8 MG/DL (ref 5–25)
CALCIUM SERPL-MCNC: 9.8 MG/DL (ref 8.4–10.2)
CHLORIDE SERPL-SCNC: 105 MMOL/L (ref 96–108)
CHOLEST SERPL-MCNC: 172 MG/DL (ref ?–200)
CO2 SERPL-SCNC: 28 MMOL/L (ref 21–32)
CREAT SERPL-MCNC: 0.69 MG/DL (ref 0.6–1.3)
EOSINOPHIL # BLD AUTO: 0.04 THOUSAND/ΜL (ref 0–0.61)
EOSINOPHIL NFR BLD AUTO: 1 % (ref 0–6)
ERYTHROCYTE [DISTWIDTH] IN BLOOD BY AUTOMATED COUNT: 12.3 % (ref 11.6–15.1)
GFR SERPL CREATININE-BSD FRML MDRD: 125 ML/MIN/1.73SQ M
GLUCOSE P FAST SERPL-MCNC: 81 MG/DL (ref 65–99)
HCG SERPL QL: NEGATIVE
HCT VFR BLD AUTO: 48.7 % (ref 34.8–46.1)
HDLC SERPL-MCNC: 76 MG/DL
HGB BLD-MCNC: 16.4 G/DL (ref 11.5–15.4)
IMM GRANULOCYTES # BLD AUTO: 0.01 THOUSAND/UL (ref 0–0.2)
IMM GRANULOCYTES NFR BLD AUTO: 0 % (ref 0–2)
LDLC SERPL CALC-MCNC: 83 MG/DL (ref 0–100)
LYMPHOCYTES # BLD AUTO: 1.85 THOUSANDS/ΜL (ref 0.6–4.47)
LYMPHOCYTES NFR BLD AUTO: 31 % (ref 14–44)
MCH RBC QN AUTO: 28.8 PG (ref 26.8–34.3)
MCHC RBC AUTO-ENTMCNC: 33.7 G/DL (ref 31.4–37.4)
MCV RBC AUTO: 85 FL (ref 82–98)
MONOCYTES # BLD AUTO: 0.4 THOUSAND/ΜL (ref 0.17–1.22)
MONOCYTES NFR BLD AUTO: 7 % (ref 4–12)
NEUTROPHILS # BLD AUTO: 3.67 THOUSANDS/ΜL (ref 1.85–7.62)
NEUTS SEG NFR BLD AUTO: 60 % (ref 43–75)
NONHDLC SERPL-MCNC: 96 MG/DL
NRBC BLD AUTO-RTO: 0 /100 WBCS
PLATELET # BLD AUTO: 217 THOUSANDS/UL (ref 149–390)
PMV BLD AUTO: 10.8 FL (ref 8.9–12.7)
POTASSIUM SERPL-SCNC: 3.8 MMOL/L (ref 3.5–5.3)
PROT SERPL-MCNC: 7.2 G/DL (ref 6.4–8.4)
RBC # BLD AUTO: 5.7 MILLION/UL (ref 3.81–5.12)
SODIUM SERPL-SCNC: 140 MMOL/L (ref 135–147)
TRIGL SERPL-MCNC: 65 MG/DL (ref ?–150)
WBC # BLD AUTO: 6 THOUSAND/UL (ref 4.31–10.16)

## 2025-01-03 PROCEDURE — 99214 OFFICE O/P EST MOD 30 MIN: CPT | Performed by: NURSE PRACTITIONER

## 2025-01-03 PROCEDURE — 84703 CHORIONIC GONADOTROPIN ASSAY: CPT | Performed by: NURSE PRACTITIONER

## 2025-01-03 PROCEDURE — 36415 COLL VENOUS BLD VENIPUNCTURE: CPT | Performed by: NURSE PRACTITIONER

## 2025-01-03 PROCEDURE — 80053 COMPREHEN METABOLIC PANEL: CPT | Performed by: NURSE PRACTITIONER

## 2025-01-03 PROCEDURE — 85025 COMPLETE CBC W/AUTO DIFF WBC: CPT | Performed by: NURSE PRACTITIONER

## 2025-01-03 PROCEDURE — 82248 BILIRUBIN DIRECT: CPT | Performed by: NURSE PRACTITIONER

## 2025-01-03 PROCEDURE — 80061 LIPID PANEL: CPT | Performed by: NURSE PRACTITIONER

## 2025-01-03 RX ORDER — ISOTRETINOIN 20 MG/1
CAPSULE ORAL
Qty: 30 CAPSULE | Refills: 0 | Status: CANCELLED | OUTPATIENT
Start: 2025-01-03

## 2025-01-03 NOTE — PROGRESS NOTES
"[facility] Dermatology Clinic Note     Patient Name: Jeri Thomas  Encounter Date: 1/3/25     Have you been cared for by a [facility] Dermatologist in the last 3 years and, if so, which description applies to you?    Yes.  I have been here within the last 3 years, and my medical history has NOT changed since that time.  I am FEMALE/of child-bearing potential.    REVIEW OF SYSTEMS:  Have you recently had or currently have any of the following? No changes in my recent health.   PAST MEDICAL HISTORY:  Have you personally ever had or currently have any of the following?  If \"YES,\" then please provide more detail. No changes in my medical history.   HISTORY OF IMMUNOSUPPRESSION: Do you have a history of any of the following:  Systemic Immunosuppression such as Diabetes, Biologic or Immunotherapy, Chemotherapy, Organ Transplantation, Bone Marrow Transplantation or Prednisone?  No     Answering \"YES\" requires the addition of the dotphrase \"IMMUNOSUPPRESSED\" as the first diagnosis of the patient's visit.   FAMILY HISTORY:  Any \"first degree relatives\" (parent, brother, sister, or child) with the following?    No changes in my family's known health.   PATIENT EXPERIENCE:    Do you want the Dermatologist to perform a COMPLETE skin exam today including a clinical examination under the \"bra and underwear\" areas?  NO  If necessary, do we have your permission to call and leave a detailed message on your Preferred Phone number that includes your specific medical information?  Yes      No Known Allergies   Current Outpatient Medications:     albuterol (Ventolin HFA) 90 mcg/act inhaler, Inhale 2 puffs every 6 (six) hours as needed for wheezing, Disp: , Rfl:     azelastine (ASTELIN) 0.1 % nasal spray, 1 spray into each nostril 2 (two) times a day Use in each nostril as directed, Disp: 30 mL, Rfl: 1    ISOtretinoin (ACCUTANE) 40 MG capsule, Take 1 capsule (40 mg total) by mouth daily. Do NOT drink alcohol, share medication or donate " "blood. Do not become pregnant. Ipledge #: [id number], Disp: 30 capsule, Rfl: 0    ketoconazole (NIZORAL) 2 % shampoo, Daily for 2 weeks straight and then on \"Mondays, Wednesdays and Fridays\": Lather into scalp ; leave on for 5 minutes and then rinse off completely., Disp: 100 mL, Rfl: 0          Whom besides the patient is providing clinical information about today's encounter?   NO ADDITIONAL HISTORIAN (patient alone provided history)    Physical Exam and Assessment/Plan by Diagnosis:      ACNE VULGARIS    Physical Exam:  Anatomic Locations Involved: Face  Global Assessment: MILD:  LESS THAN half the face is involved. Some comedones and some papules and/or pustules.  Scarring Present? Yes; Atrophic scarring:  MILD          Additional History of Present Condition:  patient was on Accutane in the past; finished therapy in March 2024 with good results. She was then prescribed Adapalene 0.3 % gel, which did not control symptoms. Patient is breaking out again and acne is worsening; patient wishes to go back on Accutane therapy.        TODAY'S PLAN:     PRESCRIPTION MANAGEMENT:  Several treatment options were discussed including topical retinoids and their side effects.     Skin Hygiene:      Wash affected areas (face, chest, and back) TWICE A DAY with a mild cleanser such as cerave.  Use only mild cleansers (hypoallergenic and without fragrances) and fragrance free detergent (not \"unscented\" products which contain a masking agent); we discussed avoiding irritants/fragranced products.  Apply a good oil-free facial moisturizer AT LEAST TWO TIMES A DAY \" such as cerave.  Minimize the application of oils and cosmetics to the affected skin.  This includes HAIR PRODUCTS such as \"leave in\" conditioners.  Unless the product specifically states that it \"won't cause acne,\" \"won't clog pores,\" and/or \"is non-comedogenic\" then it may actually CAUSE acne.  If you smoke, STOP. Nicotine increases sebum retention and increased scale " within the follicles, forming comedones (blackheads and whiteheads).  Abrasive treatments such as dermabrasion and spa facials may aggravate inflammatory acne.  Do NOT scratch or pick your acne bumps.  The evidence that diet directly affects acne remains weak.  However, diet does affect your overall health.  Eat plenty of fresh fruit and vegetables.  Avoid protein or amino acid supplements, particularly if they contain leucine. Consider a low-glycemic, low-protein and low-dairy diet.  Be mindful that certain medications may cause of aggravate acne.  Make sure to tell your Dermatologist if you start a new prescription, nutritional supplement, and/or herbal remedy.      MORNING Topical Regimen:      NONE.      EVENING Topical Regimen:      NONE.      SYSTEMIC Strategies:      INITIATE ISOTRETINOIN:  FEMALE       Initiate Isotretinoin (FEMALE)  High-Risk Medication  Medication Monitoring       ADDITIONAL FAMILY/PERSONAL HISTORY:  Family history of Crohns or Ulcerative Colitis: No  Family history of high cholesterol or high triglycerides: No       REVIEW OF SYSTEMS (High-Risk Medication):  Sexually active / Trying to get pregnant:  No  Dry Skin: No  Dry Lips/Eyes: No  Conjunctivitis: No  Difficulty seeing at night / Issues with night vision: No  Focusing Problems: No  Rash: No  Nose Bleeds: No  Angular cheilitis (cracking in corner of lips): No  Headaches: No  Photosensitivity: No  Weight Loss: No  Muscle Aches/Stiffness: No  Abdominal pain: No  Diarrhea: No  Bloody stools: No  Fatigue: No  Mood Changes: No  Depression: No  Suicidal thoughts: No       COUNSELING:  Patient counseled and understands...:  Must not get pregnant while on this medication and for at least 1 month thereafter. Yes  Cannot donate blood while taking isotretinoin and for 1 month after completing therapy. Yes  Do not share medication with anyone. Yes  Avoid excessive protein / creatine intake during isotretinoin therapy. Yes  Avoid rigorous  "work-outs 1-2 days before any lab work, which can affect liver enzymes.  Yes  Avoid any alcohol intake during isotretinoin therapy. Yes  Should stop all other acne medications unless instructed by Dermatologist otherwise. Yes  Patients counseled that possible side effects discussed, including sun sensitivity, dry lips/eyes/skin, headaches, blurry vision (pseudotumor cerebri), muscle/joint aches, GI upset, bloody stools (“IBD” including Crohn’s/Ulcerative Colitis), jaundice, liver dysfunction, lipid abnormalities, bone marrow dysfunction, mood effects, thoughts of hurting oneself or others, and flaring of acne (acne fulminans/SAPPHO). Yes  Patient understands to report any side effects of mood swings or depression or suicidal thoughts and to stop isotretinoin with any such suspected occurrence. Yes  Patient understands to confirm counseling in iPLEDGE computer system prior to picking up prescription from pharmacy. Yes    Planned Contraception Type 1: implantable hormone (Nexplanon - inserted on 5/31/2023; effective for 3 years)  Planned Contraception Type 2: male latex condom  Patient reminded that this must be listed in same order on iPledge:  Yes        LAB MONITORING:  Date that labs were last obtained (results may be in \"Labs\" or \"Media\" sections): baseline labs ordered today   Were any lab results reviewed today?  YES   Any significant lab abnormalities or concerning trends: NO    Date of most recent Pregnancy Test?   What type of Pregnancy Test was performed? serum qualitative  Result of most recent pregnancy test: NEGATIVE  Interpretation:  Is this patient PREGNANT: NO     What labs are being ordered today?  Serum HCG - QUALITATIVE (less expensive than QUANTITATIVE), CBC with differential, CMP, Lipid Panel, and OTHER:  hepatic panel   Patient understands to have labs completed as requested by ordering Dermatologist:  Yes         PRESCRIPTION MANAGEMENT:    Patient's current weight (in KILOgrams): 56 " "KILOgrams    \"GOAL DOSE\" (usually ~125 mg x weight in kg but may change on patient-by-patient basis):  7,000 mg    Starting \"DAILY DOSE\":20 mg ONCE A DAY (equivalent to 20 mg a day)  Patient's iPLEDGE# has been added to today's isotretinoin prescription:  NO, 2nd pregnancy test pending (30 days out)        iPLEDGE REGISTRATION:  Extensive discussion around side effects and possible adverse events discussed:   Yes  iPLEDGE handouts provided:   Yes  Email address:  [email or web address]  Signed consent scanned into patient's chart:  Yes  Patient preference for receiving messages from iPLEDGE:  Text Message  Patient REGISTERED today in iPLEDGE today:  YES       FOLLOW-UP APPOINTMENTS:    Patient has been offered a \"VIRTUAL FOLLOW-UP\" :  Yes, second pregnancy test will be HCG qual (patient will be away at [facility] in [place], [place]) - then appt will be made.               MEDICAL DECISION MAKING  Treatment Goal:  Resolution of the CHRONIC condition.       Chronic condition is NOT at treatment goal.  It is progressing along its expected course OR is poorly-controlled.          Scribe Attestation      I,:  Diane Saenz am acting as a scribe while in the presence of the attending physician.:       I,:  ZULEYMA Xiong personally performed the services described in this documentation    as scribed in my presence.:             "

## 2025-01-03 NOTE — PATIENT INSTRUCTIONS
"ACNE VULGARIS (\"COMMON ACNE\")      Assessment and Plan:  We reviewed the causes of acne, the “kinds” of acne, and the expected clinical course.  We discussed treatment options ranging from over-the-counter products, topical retinoids, antibiotics, BP, hormonal therapies (OCPs/spironolactone), and isotretinoin (Accutane).  We reviewed specific over-the-counter interventions and medications. Recommended typical hygiene measures including water-based facial products, washing regularly with mild cleanser, and refraining from picking and popping any pimples.   Recommended non-comedogenic sunscreen use daily.   Expectations of therapy discussed. Side effects, risks and benefits of medications discussed.  A comprehensive handout on Acne was provided.  The phone number to call in case of questions or concerns (and instructions to stop medications in such a scenario) was provided.  After lengthy discussion of etiology and treatment options, we decided to implement the following personalized treatment plan:    Based on a thorough discussion of this condition and the management approach to it (including a comprehensive discussion of the known risks, side effects and potential benefits of treatment), the patient (family) agrees to implement the following specific plan:    --------------------------------------------------------------------------------------  YOUR PERSONALIZED ACNE ACTION PLAN    “DAILY ROUTINE”      ORAL ISOTRETINOIN:      Isotretinoin 20 mg daily    Labs reviewed; pending   Reviewed: pending   HCG; CBC, CMP, Hepatic function, LIPID, HCG (qualitative x 2; patient to get second one no later than 1/23/25)  Females:  Point of care urine pregnancy test:  NOT Done TODAY  Results: pending blood work  Point of care urine pregnancy test:  Not done today  Reason: pending blood work    Patient confirmed in iPledge:      Not done today  Reason: getting blood work done today    Follow up in February virtual appointment "

## 2025-01-27 DIAGNOSIS — L21.9 SEBORRHEIC DERMATITIS: ICD-10-CM

## 2025-01-27 RX ORDER — KETOCONAZOLE 20 MG/ML
SHAMPOO, SUSPENSION TOPICAL
Qty: 120 ML | Refills: 4 | Status: SHIPPED | OUTPATIENT
Start: 2025-01-27

## 2025-01-27 NOTE — TELEPHONE ENCOUNTER
Patient: Faina Brand Date: 2017   : 1958 Attending: Yousif Ferguson MD   59 year old female Room: /A     Chief Complaint: coronary artery disease  Post-op Day #: 1  Surgical Procedure: Off Pump Coronary Artery Bypass Graft x 4    Subjective: Required bipap overnight, now up in chair on on 8L HF NC. CT site pain.    Vital Last Value 24 Hour Range   Temperature 100 °F (37.8 °C) (17 0500) Temp  Min: 94.6 °F (34.8 °C)  Max: 100.9 °F (38.3 °C)   Pulse 75 (17 0500) Pulse  Min: 71  Max: 95   Respiratory Rate (!) 33 (17) Resp  Min: 14  Max: 48   Non-Invasive   Blood Pressure 148/71 (170) BP  Min: 126/59  Max: 164/94   Arterial  Blood Pressure 121/61 (17) Arterial Line BP  Min: 80/60  Max: 161/62   Pulse Oximetry 97 % (17) SpO2  Min: 87 %  Max: 100 %     Oxygen: 8L HF NC    Hemodynamics:      Last Value 24 Hour Range   CVP (!) 14 mmHg (17) CVP (mmHg)  Min: 7 mmHg  Max: 20 mmHg   PAS/PAD   No Data Recorded   CO   No Data Recorded   CI   No Data Recorded   SV02   No Data Recorded   SVR     No Data Recorded       Weight over the past 48 Hours:  Patient Vitals for the past 48 hrs:   Weight   17 74.1 kg   17 78.3 kg      Admit Weight:   Weight: 74.1 kg (17)  BMI:   BMI (Calculated): 29 (17)    Intake/Output:    Last Stool Occurrence:      I/O this shift:  In: -   Out: 100 [Urine:40; Chest Tube:60]    I/O last 3 completed shifts:  In: 4186.8 [I.V.:3186.8; IV Piggyback:1000]  Out: 2907 [Urine:2195; Chest Tube:712]      Intake/Output Summary (Last 24 hours) at 17 0713  Last data filed at 17 0700   Gross per 24 hour   Intake          4186.76 ml   Output             3007 ml   Net          1179.76 ml       Rhythm: Sinus rhythm    Physical Exam:   Heart: Regular rate and rhythm, S1, S2 normal, no murmur, click, rub or gallop  Lungs: Diminished breath sounds  bibasilar and shallow  Please review and sign off if appropriate. breaths  Chest Tube: 1 to -20cm suction, Serosanguinous drainage and No air leak noted  Abdomen: normal findings: soft, non-tender and abnormal findings:  hypoactive bowel sounds  Skin: Skin color, texture, turgor normal. No rashes or lesions  Incision(s): Sternal DDI and Right leg DDI  Neurologic: Alert and oriented to person, place and time and Hand grasp equal bilaterally  Extremities: edema trace generalized    Laboratory Results:      Recent Labs  Lab 08/12/17  0400 08/12/17  0150 08/11/17  1855 08/11/17  1700  08/11/17  1105   POTASSIUM  --  5.0 3.8 4.1  < >  --    WBC 24.4*  --   --   --   --  16.5*   HGB 11.9*  --   --   --   --  9.6*   HCT 35.5*  --   --   --   --  28.6*     --   --   --   --  227   PT 11.7  --   --   --   --  12.0*   INR 1.1  --   --   --   --  1.1   PTT  --   --   --   --   --  28   < > = values in this interval not displayed.    ABG      Recent Labs  Lab 08/12/17  0400  08/11/17  1114   FIO2 50  < >  --    APH 7.39  < > 7.35   APO2 156*  < > 93   AHCO3 21*  < > 25   ASAT  --   --  98   < > = values in this interval not displayed.      Cultures: Reviewed    Chest X-Ray: Reviewed    Medications/Infusions:  Scheduled:   • aspirin  81 mg Oral Daily   • docusate sodium-sennosides  2 tablet Oral Daily   • [START ON 8/13/2017] bisacodyl  10 mg Rectal Once   • [START ON 8/14/2017] sodium biphosphate  1 enema Rectal Once   • [START ON 8/15/2017] polyethylene glycol  17 g Oral BID   • insulin lispro   Subcutaneous TID AC   • insulin lispro  4 Units Subcutaneous TID PC   • acetaminophen  975 mg Oral 4 times per day   • famotidine  20 mg Intravenous 2 times per day   • albuterol-ipratropium 2.5 mg/0.5 mg  3 mL Nebulization 4x Daily Resp       Continuous Infusions:   • fentaNYL 1000 mcg in sodium chloride 0.9% 100 mL infusion premix Stopped (08/11/17 1615)   • dexmedetomidine (PRECEDEX) 400 mcg in sodium chloride 0.9% 100 mL premix infusion Stopped (08/11/17 1100)   • dextrose 5 % infusion      • dextrose 5 % / sodium chloride 0.45% 1,000 mL with magnesium sulfate 5 g infusion     • dextrose 5 % / sodium chloride 0.45% with KCl 40 mEq infusion     • dextrose 5 % / sodium chloride 0.45% infusion 20 mL/hr at 08/12/17 0217   • dextrose 5 % / sodium chloride 0.45% infusion Stopped (08/12/17 0210)   • sodium chloride 0.9% infusion     • sodium chloride 0.9% infusion     • dextrose 5 % / sodium chloride 0.45% 1,000 mL with potassium chloride 40 mEq, magnesium sulfate 5 g infusion Stopped (08/12/17 0215)   • sodium chloride 0.9% infusion     • insulin regular (human) (HumuLIN R) 100 units in sodium chloride 0.9% 100 mL infusion 2.3 Units/hr (08/12/17 0218)   • vasopressin (PITRESSIN) 40 Units in sodium chloride 0.9 % 40 mL infusion Stopped (08/11/17 1200)   • niCARdipine (CARDENE) 25 mg in sodium chloride 0.9 % 250 mL standard concentration infusion Stopped (08/11/17 2020)   • nitroGLYcerin 50 mg in dextrose 5% 250 mL infusion Stopped (08/11/17 1700)       CMS Criteria:  ASA: Yes    BB: Yes    Statin:Yes    ACE: Not Applicable    Surgical Care Improvement Project:  Lino in Place: Yes, Lino to remain in place due to: Persistent clinical instability    DVT/VTE Prophylaxis:  VTE Pharmacologic Prophylaxis: Yes  VTE Mechanical Prophylaxis: Yes    Antibiotics D/C'd within 24 hours of surgery end: Yes    Beta Blocker: Yes     Surgical Central Line: Yes, medically necessary    Cardiologist: Fiona  EF: 56%  Nares MRSA:Neg MSSA: Neg  Hemoglobin A1c: 5.3  Preoperative Creatinine: 0.91    Tobacco Best Practice  Nicotine replacement therapy not ordered due to: Patient refuses nicotine gum and nicotine patch for tobacco cessation    Assessment/Plan:  S/P OPCAB x4: VSS, start ASA/BB/Statin  Disposition of chest tubes: d/c  Leukocytosis: noted pre-op, check u/a, monitor  Hypoxemia: wean o2 as tolerates, d/c CTs, encourage IS & activity  Hypervolemia: IV lasix  Current Tobacco Use: add flutter therapy, mucinex, declined  nicotine substitute  Protein S Deficiency: was on 80 mg BID lovenox pre-op, per Dr Ferguson he does not want her back on lovenox and we will start coumadin today  H/O CVA: order PT/OT  HTN: start BB today      Pathways:  Wires Out: No  Ambulating: No  Coumadin: Yes, for Protein S Deficiency    Discussed with or notes reviewed:  Patient and RN    Discharge Disposition: Home

## 2025-02-03 DIAGNOSIS — Z79.899 ON ISOTRETINOIN THERAPY: ICD-10-CM

## 2025-02-03 DIAGNOSIS — Z79.899 HIGH RISK MEDICATION USE: ICD-10-CM

## 2025-02-03 DIAGNOSIS — L70.0 ACNE VULGARIS: Primary | ICD-10-CM

## 2025-02-03 RX ORDER — ISOTRETINOIN 20 MG/1
20 CAPSULE ORAL DAILY
Qty: 30 CAPSULE | Refills: 0 | Status: SHIPPED | OUTPATIENT
Start: 2025-02-03 | End: 2025-03-05

## 2025-03-04 ENCOUNTER — TELEMEDICINE (OUTPATIENT)
Age: 20
End: 2025-03-04
Payer: COMMERCIAL

## 2025-03-04 DIAGNOSIS — Z79.899 HIGH RISK MEDICATION USE: ICD-10-CM

## 2025-03-04 DIAGNOSIS — L70.0 ACNE VULGARIS: Primary | ICD-10-CM

## 2025-03-04 DIAGNOSIS — Z79.899 ON ISOTRETINOIN THERAPY: ICD-10-CM

## 2025-03-04 DIAGNOSIS — L85.3 XEROSIS OF SKIN: ICD-10-CM

## 2025-03-04 PROCEDURE — 98006 SYNCH AUDIO-VIDEO EST MOD 30: CPT | Performed by: NURSE PRACTITIONER

## 2025-03-04 RX ORDER — ISOTRETINOIN 30 MG/1
30 CAPSULE ORAL DAILY
Qty: 30 CAPSULE | Refills: 0 | Status: SHIPPED | OUTPATIENT
Start: 2025-03-04 | End: 2025-04-03

## 2025-03-04 NOTE — PROGRESS NOTES
"Virtual Regular VisitName: Jeri Thomas      : 2005      MRN: [id number]  Encounter Provider: ZULEYMA Xiong  Encounter Date: 3/4/2025   Encounter department: [facility] DERMATOLOGY [place]  :  Assessment & Plan        History of Present Illness     HPI  Review of Systems    Objective   There were no vitals taken for this visit.    Physical Exam    Administrative Statements   Encounter provider ZULEYMA Xiong    The Patient is located at Home and in the following state in which I hold an active license [place].    The patient was identified by name and date of birth. Jeri Thomas was informed that this is a telemedicine visit and that the visit is being conducted through the [employer] platform. She agrees to proceed..  My office door was closed. No one else was in the room.  She acknowledged consent and understanding of privacy and security of the video platform. The patient has agreed to participate and understands they can discontinue the visit at any time.    I have spent a total time of 15 minutes in caring for this patient on the day of the visit/encounter including Instructions for management, Patient and family education, Counseling / Coordination of care, and Obtaining or reviewing history  , not including the time spent for establishing the audio/video connection.    [facility] Dermatology Clinic Note     Patient Name: Jeri Thomas  Encounter Date: 3/4/25     Have you been cared for by a [facility] Dermatologist in the last 3 years and, if so, which description applies to you?    Yes.  I have been here within the last 3 years, and my medical history has NOT changed since that time.  I am FEMALE/of child-bearing potential.    REVIEW OF SYSTEMS:  Have you recently had or currently have any of the following? No changes in my recent health.   PAST MEDICAL HISTORY:  Have you personally ever had or currently have any of the following?  If \"YES,\" then please provide more " "detail. No changes in my medical history.   HISTORY OF IMMUNOSUPPRESSION: Do you have a history of any of the following:  Systemic Immunosuppression such as Diabetes, Biologic or Immunotherapy, Chemotherapy, Organ Transplantation, Bone Marrow Transplantation or Prednisone?  No     Answering \"YES\" requires the addition of the dotphrase \"IMMUNOSUPPRESSED\" as the first diagnosis of the patient's visit.   FAMILY HISTORY:  Any \"first degree relatives\" (parent, brother, sister, or child) with the following?    No changes in my family's known health.   PATIENT EXPERIENCE:    If necessary, do we have your permission to call and leave a detailed message on your Preferred Phone number that includes your specific medical information?  Yes      No Known Allergies   Current Outpatient Medications:     ISOtretinoin (ACCUTANE) 40 MG capsule, Take 1 capsule (40 mg total) by mouth daily. Do NOT drink alcohol, share medication or donate blood. Do not become pregnant. Ipledge #: [id number], Disp: 30 capsule, Rfl: 0    albuterol (Ventolin HFA) 90 mcg/act inhaler, Inhale 2 puffs every 6 (six) hours as needed for wheezing, Disp: , Rfl:     azelastine (ASTELIN) 0.1 % nasal spray, 1 spray into each nostril 2 (two) times a day Use in each nostril as directed, Disp: 30 mL, Rfl: 1    ISOtretinoin (ACCUTANE) 20 MG capsule, Take 1 capsule (20 mg total) by mouth daily, Disp: 30 capsule, Rfl: 0    ketoconazole (NIZORAL) 2 % shampoo, DAILY FOR 2 WEEKS STRAIGHT AND THEN ON \"MONDAYS, WEDNESDAYS AND FRIDAYS\": LATHER INTO SCALP LEAVE ON FOR 5 MINUTES AND THEN RINSE OFF COMPLETELY., Disp: 120 mL, Rfl: 4          Whom besides the patient is providing clinical information about today's encounter?   NO ADDITIONAL HISTORIAN (patient alone provided history)    Physical Exam and Assessment/Plan by Diagnosis:    ISOTRETINOIN \"ACCUTANE\": FEMALE    Age: 19 y.o.  Weight (in KILOgrams): 56    Ipledge number: [id number]    Contraception Type 1: implantable " "hormone  Contraception Type 2: male latex condom  Patient reminded that this must be listed in same order on iPledge. Yes       \"Goal Dose\" in mg (125 mg x weight in kg): 7,000 mg    Interim month  \"Daily Dose\" of Isotretinoin the patient has actually been taking since last visit (this is usually what was prescribed): 20 mg  \"Total # of Days\" patient took Isotretinoin since last visit (this is usually 30):  30 days  \"Total Monthly Dose\" in mg since last visit (this equals \"Daily Dose\" x \"Total # of Days\"): 600 mg    Cumulative dosage  \"Total cumulative Dose\" in mg (add the above \"Total Monthly Dose\" with \"Total Cumulative Dose\" from last visit: 600 mg        Symptoms  Conjunctivitis: No  Night Blindness/ Issues with night vision: No  Focusing Problems: No  Dry Lips/Eyes: YES  Dry Skin: YES  Rash: No  Nose Bleeds: No  Angular cheilitis (cracking in corner of lips): No  Headaches: No  Photosensitivity: No  Dry Anus: No  Depression: No  Mood Changes: No  Suicidal thoughts: No  Fatigue: No  Weight Loss: No  Muscle Pain/Stiffness: No  Abdominal pain: No  Diarrhea: No  Bloody stools: No    Physical Exam  Psychiatric/Mood: normal, pleasant   Anatomic Location Affected:  face  Morphological Description:  Open/Closed Comedones:  Rare (\"Almost Clear\")  Inflammatory Papules/Pustules:  Rare (\"Almost Clear\")  Nodules:  Rare (\"Almost Clear\")  Scarring:  Rare (\"Almost Clear\")  Excoriations:  No evidence (\"Clear\")  Local Skin Redness/Erythema:  Rare (\"Almost Clear\")  Local Skin Dryness/Scaling:  Rare (\"Almost Clear\")  Local Skin Dyspigmentation:  No evidence (\"Clear\")      Labs  Date of last labs: 1/3/25  Completed: YES  Labs reviewed: within normal limits  Pregnancy test: urine pregnancy at home   Result: negative  Interpretation- pregnant: No      Additional History of Present Condition:  patient presents for Accutane f/u; she has completed her first month on 20 mg daily. She denies any significant side effects other than " "dryness. She is tolerating the medication well.     DIAGNOSES:    Acne Vulgaris  High Risk Medication  Medication Monitoring  Xerosis (see below for patient education)    Assessment and Plan:  Target Total Dose per KILOgram:  125 mg/KILOgram (this may change this on a patient-by-patient basis)  Planned daily dose for next 30 days: 30 mg daily   (please remember to add patient's \"Ipledge number\" to actual prescription):  [id number]  Return to clinic in about 1 month, please review ipledge guidelines in terms of timing (see below for patient education)  Get labs 1-2 days before next appointment: No  Patient confirmed in iPledge: YES  Patients counseled:  Cannot donate blood while taking isotretinoin and for 1 month after completing therapy. YES  Do not share medication with anyone. YES  Possible side effects discussed, including sun sensitivity, dry lips/eyes/skin, headaches, blurry vision (pseudotumor cerebri), muscle/joint aches, GI upset, bloody stools (“IBD” including Crohn’s/Ulcerative Colitis), jaundice, liver dysfunction, lipid abnormalities, bone marrow dysfunction, mood effects, thoughts of hurting oneself or others, and flaring of acne (acne fulminans/SAPPHO). YES  Females cannot get pregnant and must be on two forms of birth control while on therapy and at least one month after. YES  Report any side effects of mood swings or depression and stop medication upon occurrence. YES  Patient needs to confirm counseling in [employer] prior to picking up prescription. YES    Scribe Attestation      I,:  ZULEYMA Xiong am acting as a scribe while in the presence of the attending physician.:       I,:  ZULEYMA Xiong personally performed the services described in this documentation    as scribed in my presence.:              "

## 2025-03-04 NOTE — PATIENT COMMUNICATION
I called the pharmacy to see what was going on, accutane sent this morning. Pharmacist said it was waiting for confirmation. Advised that everything should have been taken care of. She will re check and call patient once prescription is ready. Called patient to let her know, she verbalized understanding.

## 2025-04-01 ENCOUNTER — TELEMEDICINE (OUTPATIENT)
Age: 20
End: 2025-04-01
Payer: COMMERCIAL

## 2025-04-01 DIAGNOSIS — L70.0 ACNE VULGARIS: Primary | ICD-10-CM

## 2025-04-01 DIAGNOSIS — L85.3 XEROSIS OF SKIN: ICD-10-CM

## 2025-04-01 DIAGNOSIS — Z79.2 ON ISOTRETINOIN THERAPY: ICD-10-CM

## 2025-04-01 DIAGNOSIS — Z79.899 HIGH RISK MEDICATION USE: ICD-10-CM

## 2025-04-01 PROCEDURE — 98006 SYNCH AUDIO-VIDEO EST MOD 30: CPT | Performed by: NURSE PRACTITIONER

## 2025-04-01 NOTE — PROGRESS NOTES
Virtual Regular VisitName: Jeri Thomas      : 2005      MRN: [id number]  Encounter Provider: ZULEYMA Xiong  Encounter Date: 2025   Encounter department: [facility] DERMATOLOGY [place]  :  Assessment & Plan        History of Present Illness     HPI  Review of Systems    Objective   There were no vitals taken for this visit.    Physical Exam    Administrative Statements   Encounter provider ZULEYMA Xiong    The Patient is located at Home and in the following state in which I hold an active license [place].    The patient was identified by name and date of birth. Jeri Thomas was informed that this is a telemedicine visit and that the visit is being conducted through the [employer] platform. She agrees to proceed..  My office door was closed. No one else was in the room.  She acknowledged consent and understanding of privacy and security of the video platform. The patient has agreed to participate and understands they can discontinue the visit at any time.    I have spent a total time of 15 minutes in caring for this patient on the day of the visit/encounter including Instructions for management, Patient and family education, Counseling / Coordination of care, Documenting in the medical record, and Obtaining or reviewing history  , not including the time spent for establishing the audio/video connection.    [facility] Dermatology Clinic Note     Patient Name: Jeri Thomas  Encounter Date: 25     Have you been cared for by a [facility] Dermatologist in the last 3 years and, if so, which description applies to you?    Yes.  I have been here within the last 3 years, and my medical history has NOT changed since that time.  I am FEMALE/of child-bearing potential.    REVIEW OF SYSTEMS:  Have you recently had or currently have any of the following? No changes in my recent health.   PAST MEDICAL HISTORY:  Have you personally ever had or currently have any of the following?  If  "\"YES,\" then please provide more detail. No changes in my medical history.   HISTORY OF IMMUNOSUPPRESSION: Do you have a history of any of the following:  Systemic Immunosuppression such as Diabetes, Biologic or Immunotherapy, Chemotherapy, Organ Transplantation, Bone Marrow Transplantation or Prednisone?  No     Answering \"YES\" requires the addition of the dotphrase \"IMMUNOSUPPRESSED\" as the first diagnosis of the patient's visit.   FAMILY HISTORY:  Any \"first degree relatives\" (parent, brother, sister, or child) with the following?    No changes in my family's known health.   PATIENT EXPERIENCE:    If necessary, do we have your permission to call and leave a detailed message on your Preferred Phone number that includes your specific medical information?  Yes      No Known Allergies   Current Outpatient Medications:     ISOtretinoin (ACCUTANE) 40 MG capsule, Take 1 capsule (40 mg total) by mouth daily. Do NOT drink alcohol, share medication or donate blood. Do not become pregnant. Ipledge #: [id number], Disp: 30 capsule, Rfl: 0    albuterol (Ventolin HFA) 90 mcg/act inhaler, Inhale 2 puffs every 6 (six) hours as needed for wheezing, Disp: , Rfl:     azelastine (ASTELIN) 0.1 % nasal spray, 1 spray into each nostril 2 (two) times a day Use in each nostril as directed, Disp: 30 mL, Rfl: 1    ISOtretinoin (ACCUTANE) 20 MG capsule, Take 1 capsule (20 mg total) by mouth daily, Disp: 30 capsule, Rfl: 0    ISOtretinoin (ACCUTANE) 30 MG capsule, Take 1 capsule (30 mg total) by mouth daily, Disp: 30 capsule, Rfl: 0    ketoconazole (NIZORAL) 2 % shampoo, DAILY FOR 2 WEEKS STRAIGHT AND THEN ON \"MONDAYS, WEDNESDAYS AND FRIDAYS\": LATHER INTO SCALP LEAVE ON FOR 5 MINUTES AND THEN RINSE OFF COMPLETELY., Disp: 120 mL, Rfl: 4          Whom besides the patient is providing clinical information about today's encounter?   NO ADDITIONAL HISTORIAN (patient alone provided history)    Physical Exam and Assessment/Plan by " "Diagnosis:    ISOTRETINOIN \"ACCUTANE\": FEMALE     Age: 19 y.o.  Weight (in KILOgrams): 56     Ipledge number: [id number]     Contraception Type 1: implantable hormone  Contraception Type 2: male latex condom  Patient reminded that this must be listed in same order on iPledge. Yes         \"Goal Dose\" in mg (125 mg x weight in kg): 7,000 mg     Interim month  \"Daily Dose\" of Isotretinoin the patient has actually been taking since last visit (this is usually what was prescribed): 30 mg  \"Total # of Days\" patient took Isotretinoin since last visit (this is usually 30):  30 days  \"Total Monthly Dose\" in mg since last visit (this equals \"Daily Dose\" x \"Total # of Days\"): 900 mg     Cumulative dosage  \"Total cumulative Dose\" in mg (add the above \"Total Monthly Dose\" with \"Total Cumulative Dose\" from last visit: 1,500 mg           Symptoms  Conjunctivitis: No  Night Blindness/ Issues with night vision: No  Focusing Problems: No  Dry Lips/Eyes: YES  Dry Skin: YES  Rash: No  Nose Bleeds: No  Angular cheilitis (cracking in corner of lips): No  Headaches: No  Photosensitivity: No  Dry Anus: No  Depression: No  Mood Changes: No  Suicidal thoughts: No  Fatigue: No  Weight Loss: No  Muscle Pain/Stiffness: No  Abdominal pain: No  Diarrhea: No  Bloody stools: No     Physical Exam  Psychiatric/Mood: normal, pleasant   Anatomic Location Affected:  face  Morphological Description:  Open/Closed Comedones:  Rare (\"Almost Clear\")  Inflammatory Papules/Pustules:  Rare (\"Almost Clear\")  Nodules:  Rare (\"Almost Clear\")  Scarring:  Rare (\"Almost Clear\")  Excoriations:  No evidence (\"Clear\")  Local Skin Redness/Erythema:  Rare (\"Almost Clear\")  Local Skin Dryness/Scaling:  Rare (\"Almost Clear\")  Local Skin Dyspigmentation:  No evidence (\"Clear\")        Labs  Date of last labs: 1/3/25  Completed: YES  Labs reviewed: within normal limits  Pregnancy test: urine pregnancy at home - pending   Result: pending  Interpretation- pregnant: pending       " "  Additional History of Present Condition:  patient presents for Accutane f/u; she has completed her second month on 30 mg daily. She denies any significant side effects other than dryness. She is tolerating the medication well.      DIAGNOSES:       Acne Vulgaris  High Risk Medication  Medication Monitoring  Xerosis (see below for patient education)     Assessment and Plan:  Target Total Dose per KILOgram:  125 mg/KILOgram (this may change this on a patient-by-patient basis)  Planned daily dose for next 30 days: 40 mg daily pending at home urine pregnancy test  (please remember to add patient's \"Ipledge number\" to actual prescription):  [id number]  Return to clinic in about 1 month, please review ipledge guidelines in terms of timing (see below for patient education)  Get labs 1-2 days before next appointment: No  Patient confirmed in iPledge: no, pending at home urine pregnancy   Patients counseled:  Cannot donate blood while taking isotretinoin and for 1 month after completing therapy. YES  Do not share medication with anyone. YES  Possible side effects discussed, including sun sensitivity, dry lips/eyes/skin, headaches, blurry vision (pseudotumor cerebri), muscle/joint aches, GI upset, bloody stools (“IBD” including Crohn’s/Ulcerative Colitis), jaundice, liver dysfunction, lipid abnormalities, bone marrow dysfunction, mood effects, thoughts of hurting oneself or others, and flaring of acne (acne fulminans/SAPPHO). YES  Females cannot get pregnant and must be on two forms of birth control while on therapy and at least one month after. YES  Report any side effects of mood swings or depression and stop medication upon occurrence. YES  Patient needs to confirm counseling in [place] prior to picking up prescription. YES    Scribe Attestation      I,:  ZULEYMA Xiong am acting as a scribe while in the presence of the attending physician.:       I,:  ZULEYMA Xiong personally performed the " services described in this documentation    as scribed in my presence.:

## 2025-04-02 DIAGNOSIS — L85.3 XEROSIS OF SKIN: ICD-10-CM

## 2025-04-02 DIAGNOSIS — Z79.899 HIGH RISK MEDICATION USE: ICD-10-CM

## 2025-04-02 DIAGNOSIS — Z79.2 ON ISOTRETINOIN THERAPY: ICD-10-CM

## 2025-04-02 DIAGNOSIS — L70.0 ACNE VULGARIS: Primary | ICD-10-CM

## 2025-04-02 RX ORDER — ISOTRETINOIN 40 MG/1
40 CAPSULE ORAL DAILY
Qty: 30 CAPSULE | Refills: 0 | Status: SHIPPED | OUTPATIENT
Start: 2025-04-02 | End: 2025-05-02

## 2025-05-08 ENCOUNTER — TELEMEDICINE (OUTPATIENT)
Age: 20
End: 2025-05-08
Payer: COMMERCIAL

## 2025-05-08 DIAGNOSIS — L70.0 ACNE VULGARIS: Primary | ICD-10-CM

## 2025-05-08 DIAGNOSIS — Z79.2 ON ISOTRETINOIN THERAPY: ICD-10-CM

## 2025-05-08 DIAGNOSIS — L85.3 XEROSIS OF SKIN: ICD-10-CM

## 2025-05-08 DIAGNOSIS — Z79.899 HIGH RISK MEDICATION USE: ICD-10-CM

## 2025-05-08 PROCEDURE — 98005 SYNCH AUDIO-VIDEO EST LOW 20: CPT | Performed by: NURSE PRACTITIONER

## 2025-05-08 RX ORDER — ISOTRETINOIN 40 MG/1
40 CAPSULE ORAL DAILY
Qty: 30 CAPSULE | Refills: 0 | Status: SHIPPED | OUTPATIENT
Start: 2025-05-08 | End: 2025-06-07

## 2025-05-08 NOTE — PROGRESS NOTES
"Virtual Regular VisitName: Jeri Thomas      : 2005      MRN: [id number]  Encounter Provider: ZULEYMA Xiong  Encounter Date: 2025   Encounter department: [facility] DERMATOLOGY [place]  :  Assessment & Plan        History of Present Illness     HPI  Review of Systems    Objective   There were no vitals taken for this visit.    Physical Exam    Administrative Statements   Encounter provider ZULEYMA Xiong    The Patient is located at Home and in the following state in which I hold an active license [place].    The patient was identified by name and date of birth. Jeri Thomas was informed that this is a telemedicine visit and that the visit is being conducted through the [employer] platform. She agrees to proceed..  My office door was closed. No one else was in the room.  She acknowledged consent and understanding of privacy and security of the video platform. The patient has agreed to participate and understands they can discontinue the visit at any time.    I have spent a total time of 10 minutes in caring for this patient on the day of the visit/encounter including Instructions for management, Patient and family education, and Counseling / Coordination of care, not including the time spent for establishing the audio/video connection.    [facility] Dermatology Clinic Note     Patient Name: Jeri Thomas  Encounter Date: 2025       Have you been cared for by a [facility] Dermatologist in the last 3 years and, if so, which description applies to you? Yes. I have been here within the last 3 years, and my medical history has NOT changed since that time. I am of child-bearing potential.     REVIEW OF SYSTEMS:  Have you recently had or currently have any of the following? No changes in my recent health.   PAST MEDICAL HISTORY:  Have you personally ever had or currently have any of the following?  If \"YES,\" then please provide more detail. No changes in my medical history. " "  HISTORY OF IMMUNOSUPPRESSION: Do you have a history of any of the following:  Systemic Immunosuppression such as Diabetes, Biologic or Immunotherapy, Chemotherapy, Organ Transplantation, Bone Marrow Transplantation or Prednisone?  No     Answering \"YES\" requires the addition of the dotphrase \"IMMUNOSUPPRESSED\" as the first diagnosis of the patient's visit.   FAMILY HISTORY:  Any \"first degree relatives\" (parent, brother, sister, or child) with the following?    No changes in my family's known health.   PATIENT EXPERIENCE:    If necessary, do we have your permission to call and leave a detailed message on your Preferred Phone number that includes your specific medical information?  Yes      No Known Allergies   Current Outpatient Medications:     ISOtretinoin (ACCUTANE) 40 MG capsule, Take 1 capsule (40 mg total) by mouth daily. Do NOT drink alcohol, share medication or donate blood. Do not become pregnant. Ipledge #: [id number], Disp: 30 capsule, Rfl: 0    albuterol (Ventolin HFA) 90 mcg/act inhaler, Inhale 2 puffs every 6 (six) hours as needed for wheezing, Disp: , Rfl:     azelastine (ASTELIN) 0.1 % nasal spray, 1 spray into each nostril 2 (two) times a day Use in each nostril as directed, Disp: 30 mL, Rfl: 1    ISOtretinoin (ACCUTANE) 20 MG capsule, Take 1 capsule (20 mg total) by mouth daily, Disp: 30 capsule, Rfl: 0    ISOtretinoin (ACCUTANE) 30 MG capsule, Take 1 capsule (30 mg total) by mouth daily, Disp: 30 capsule, Rfl: 0    ISOtretinoin (ACCUTANE) 40 MG capsule, Take 1 capsule (40 mg total) by mouth daily, Disp: 30 capsule, Rfl: 0    ketoconazole (NIZORAL) 2 % shampoo, DAILY FOR 2 WEEKS STRAIGHT AND THEN ON \"MONDAYS, WEDNESDAYS AND FRIDAYS\": LATHER INTO SCALP LEAVE ON FOR 5 MINUTES AND THEN RINSE OFF COMPLETELY., Disp: 120 mL, Rfl: 4              Whom besides the patient is providing clinical information about today's encounter?   NO ADDITIONAL HISTORIAN (patient alone provided history)    Physical Exam " "and Assessment/Plan by Diagnosis:    ISOTRETINOIN \"ACCUTANE\": FEMALE     Age: 19 y.o.  Weight (in KILOgrams): 56     Ipledge number: [id number]     Contraception Type 1: implantable hormone  Contraception Type 2: male latex condom  Patient reminded that this must be listed in same order on iPledge. Yes         \"Goal Dose\" in mg (125 mg x weight in kg): 7,000 mg     Interim month  \"Daily Dose\" of Isotretinoin the patient has actually been taking since last visit (this is usually what was prescribed): 40 mg  \"Total # of Days\" patient took Isotretinoin since last visit (this is usually 30):  30 days  \"Total Monthly Dose\" in mg since last visit (this equals \"Daily Dose\" x \"Total # of Days\"): 1,200 mg     Cumulative dosage  \"Total cumulative Dose\" in mg (add the above \"Total Monthly Dose\" with \"Total Cumulative Dose\" from last visit: 2,700 mg           Symptoms  Conjunctivitis: No  Night Blindness/ Issues with night vision: No  Focusing Problems: No  Dry Lips/Eyes: YES  Dry Skin: YES  Rash: No  Nose Bleeds: No  Angular cheilitis (cracking in corner of lips): No  Headaches: No  Photosensitivity: No  Dry Anus: No  Depression: No  Mood Changes: No  Suicidal thoughts: No  Fatigue: No  Weight Loss: No  Muscle Pain/Stiffness: No  Abdominal pain: No  Diarrhea: No  Bloody stools: No     Physical Exam  Psychiatric/Mood: normal, pleasant   Anatomic Location Affected:  face  Morphological Description:  Open/Closed Comedones:  Rare (\"Almost Clear\")  Inflammatory Papules/Pustules:  Rare (\"Almost Clear\")  Nodules:  Rare (\"Almost Clear\")  Scarring:  Rare (\"Almost Clear\")  Excoriations:  No evidence (\"Clear\")  Local Skin Redness/Erythema:  Rare (\"Almost Clear\")  Local Skin Dryness/Scaling:  Rare (\"Almost Clear\")  Local Skin Dyspigmentation:  No evidence (\"Clear\")        Labs  Date of last labs: 1/3/25  Completed: YES  Labs reviewed: within normal limits  Pregnancy test: urine pregnancy at home - pending   Result: " "pending  Interpretation- pregnant: pending         Additional History of Present Condition:  patient presents for Accutane f/u; she has completed her third month on 40 mg daily. She denies any significant side effects other than dryness. She is tolerating the medication well.      DIAGNOSES:        Acne Vulgaris  High Risk Medication  Medication Monitoring  Xerosis (see below for patient education)     Assessment and Plan:  Target Total Dose per KILOgram:  125 mg/KILOgram (this may change this on a patient-by-patient basis)  Planned daily dose for next 30 days: 40 mg daily pending at home urine pregnancy test  (please remember to add patient's \"Ipledge number\" to actual prescription):  [id number]  Return to clinic in about 1 month, please review ipledge guidelines in terms of timing (see below for patient education)  Get labs 1-2 days before next appointment: No  Patient confirmed in iPledge: no, pending at home urine pregnancy   Patients counseled:  Cannot donate blood while taking isotretinoin and for 1 month after completing therapy. YES  Do not share medication with anyone. YES  Possible side effects discussed, including sun sensitivity, dry lips/eyes/skin, headaches, blurry vision (pseudotumor cerebri), muscle/joint aches, GI upset, bloody stools (“IBD” including Crohn’s/Ulcerative Colitis), jaundice, liver dysfunction, lipid abnormalities, bone marrow dysfunction, mood effects, thoughts of hurting oneself or others, and flaring of acne (acne fulminans/SAPPHO). YES  Females cannot get pregnant and must be on two forms of birth control while on therapy and at least one month after. YES  Report any side effects of mood swings or depression and stop medication upon occurrence. YES  Patient needs to confirm counseling in [employer] [place] prior to picking up prescription. YES    Scribe Attestation      I,:  ZULEYMA Xiong am acting as a scribe while in the presence of the attending physician.:       I,:  Haris " ZULEYMA Callahan personally performed the services described in this documentation    as scribed in my presence.:

## 2025-06-04 ENCOUNTER — TELEMEDICINE (OUTPATIENT)
Dept: DERMATOLOGY | Facility: CLINIC | Age: 20
End: 2025-06-04
Payer: COMMERCIAL

## 2025-06-04 DIAGNOSIS — Z79.899 HIGH RISK MEDICATION USE: ICD-10-CM

## 2025-06-04 DIAGNOSIS — L70.0 ACNE VULGARIS: Primary | ICD-10-CM

## 2025-06-04 DIAGNOSIS — L85.3 XEROSIS OF SKIN: ICD-10-CM

## 2025-06-04 DIAGNOSIS — Z79.2 ON ISOTRETINOIN THERAPY: ICD-10-CM

## 2025-06-04 PROCEDURE — 98006 SYNCH AUDIO-VIDEO EST MOD 30: CPT | Performed by: NURSE PRACTITIONER

## 2025-06-04 RX ORDER — ISOTRETINOIN 20 MG/1
20 CAPSULE ORAL DAILY
Qty: 30 CAPSULE | Refills: 0 | Status: SHIPPED | OUTPATIENT
Start: 2025-06-04 | End: 2025-07-04

## 2025-06-04 RX ORDER — ISOTRETINOIN 30 MG/1
30 CAPSULE ORAL DAILY
Qty: 30 CAPSULE | Refills: 0 | Status: SHIPPED | OUTPATIENT
Start: 2025-06-04 | End: 2025-07-04

## 2025-06-04 NOTE — PROGRESS NOTES
Virtual Regular Visit  Name: Jeri Thomas      : 2005      MRN: [id number]  Encounter Provider: ZULEYMA Xiong  Encounter Date: 2025   Encounter department: [facility] DERMATOLOGY [facility]  :  Assessment & Plan  Acne vulgaris    Orders:    ISOtretinoin (ACCUTANE) 30 MG capsule; Take 1 capsule (30 mg total) by mouth daily With 20 mg capsule (50 mg daily).    ISOtretinoin (ACCUTANE) 20 MG capsule; Take 1 capsule (20 mg total) by mouth daily With 30 mg capsule (50 mg daily).    On isotretinoin therapy    Orders:    ISOtretinoin (ACCUTANE) 30 MG capsule; Take 1 capsule (30 mg total) by mouth daily With 20 mg capsule (50 mg daily).    ISOtretinoin (ACCUTANE) 20 MG capsule; Take 1 capsule (20 mg total) by mouth daily With 30 mg capsule (50 mg daily).    Xerosis of skin         High risk medication use    Orders:    ISOtretinoin (ACCUTANE) 30 MG capsule; Take 1 capsule (30 mg total) by mouth daily With 20 mg capsule (50 mg daily).    ISOtretinoin (ACCUTANE) 20 MG capsule; Take 1 capsule (20 mg total) by mouth daily With 30 mg capsule (50 mg daily).          History of Present Illness     HPI  Review of Systems    Objective   There were no vitals taken for this visit.    Physical Exam    Administrative Statements   Encounter provider ZULEYMA Xiong    The Patient is located at Home and in the following state in which I hold an active license [place].    The patient was identified by name and date of birth. Jeri Thomas was informed that this is a telemedicine visit and that the visit is being conducted through the [employer] platform. She agrees to proceed..  My office door was closed. No one else was in the room.  She acknowledged consent and understanding of privacy and security of the video platform. The patient has agreed to participate and understands they can discontinue the visit at any time.    I have spent a total time of 10 minutes in caring for this patient on the day of the  "visit/encounter including Risks and benefits of tx options, Instructions for management, Patient and family education, Counseling / Coordination of care, Documenting in the medical record, and Obtaining or reviewing history  , not including the time spent for establishing the audio/video connection.    [facility] Dermatology Clinic Note     Patient Name: Jeri Thomas  Encounter Date: 6/4/2025       Have you been cared for by a [facility] Dermatologist in the last 3 years and, if so, which description applies to you? Yes. I have been here within the last 3 years, and my medical history has NOT changed since that time. I am of child-bearing potential.     REVIEW OF SYSTEMS:  Have you recently had or currently have any of the following? No changes in my recent health.   PAST MEDICAL HISTORY:  Have you personally ever had or currently have any of the following?  If \"YES,\" then please provide more detail. No changes in my medical history.   HISTORY OF IMMUNOSUPPRESSION: Do you have a history of any of the following:  Systemic Immunosuppression such as Diabetes, Biologic or Immunotherapy, Chemotherapy, Organ Transplantation, Bone Marrow Transplantation or Prednisone?  No     Answering \"YES\" requires the addition of the dotphrase \"IMMUNOSUPPRESSED\" as the first diagnosis of the patient's visit.   FAMILY HISTORY:  Any \"first degree relatives\" (parent, brother, sister, or child) with the following?    No changes in my family's known health.   PATIENT EXPERIENCE:    If necessary, do we have your permission to call and leave a detailed message on your Preferred Phone number that includes your specific medical information?  Yes      Allergies[1] Current Medications[2]              Whom besides the patient is providing clinical information about today's encounter?   NO ADDITIONAL HISTORIAN (patient alone provided history)    Physical Exam and Assessment/Plan by Diagnosis:    ISOTRETINOIN \"ACCUTANE\": FEMALE     Age: 19 " "y.o.  Weight (in KILOgrams): 56     Ipledge number: [id number]     Contraception Type 1: implantable hormone  Contraception Type 2: male latex condom  Patient reminded that this must be listed in same order on iPledge. Yes         \"Goal Dose\" in mg (125 mg x weight in kg): 7,000 mg     Interim month  \"Daily Dose\" of Isotretinoin the patient has actually been taking since last visit (this is usually what was prescribed): 40 mg  \"Total # of Days\" patient took Isotretinoin since last visit (this is usually 30):  30 days  \"Total Monthly Dose\" in mg since last visit (this equals \"Daily Dose\" x \"Total # of Days\"): 1,200 mg     Cumulative dosage  \"Total cumulative Dose\" in mg (add the above \"Total Monthly Dose\" with \"Total Cumulative Dose\" from last visit: 3,900 mg           Symptoms  Conjunctivitis: No  Night Blindness/ Issues with night vision: No  Focusing Problems: No  Dry Lips/Eyes: YES  Dry Skin: YES  Rash: No  Nose Bleeds: No  Angular cheilitis (cracking in corner of lips): No  Headaches: No  Photosensitivity: No  Dry Anus: No  Depression: No  Mood Changes: No  Suicidal thoughts: No  Fatigue: No  Weight Loss: No  Muscle Pain/Stiffness: No  Abdominal pain: No  Diarrhea: No  Bloody stools: No     Physical Exam  Psychiatric/Mood: normal, pleasant   Anatomic Location Affected:  face  Morphological Description:  Open/Closed Comedones:  Rare (\"Almost Clear\")  Inflammatory Papules/Pustules:  Rare (\"Almost Clear\")  Nodules:  Clear   Scarring:  Rare (\"Almost Clear\")  Excoriations:  No evidence (\"Clear\")  Local Skin Redness/Erythema:  Clear   Local Skin Dryness/Scaling:  Rare (\"Almost Clear\")  Local Skin Dyspigmentation:  No evidence (\"Clear\")        Labs  Date of last labs: 1/3/25  Completed: YES  Labs reviewed: within normal limits  Pregnancy test: urine pregnancy at home   Result: negative  Interpretation- pregnant: NOT PREGNANT         Additional History of Present Condition:  patient presents for Accutane f/u; she has " "completed her 4th month on 40 mg daily. She denies any significant side effects other than dryness. She is tolerating the medication well.      DIAGNOSES:        Acne Vulgaris  High Risk Medication  Medication Monitoring  Xerosis (see below for patient education)     Assessment and Plan:  Target Total Dose per KILOgram:  125 mg/KILOgram (this may change this on a patient-by-patient basis)  Planned daily dose for next 30 days: 50 mg daily (20 + 30 mg capsules)   (please remember to add patient's \"Ipledge number\" to actual prescription):  [id number]  Return to clinic in about 1 month, please review ipledge guidelines in terms of timing (see below for patient education)  Get labs 1-2 days before next appointment: No, likely after next month's appt   Patient confirmed in iPledge: YES   Patients counseled:  Cannot donate blood while taking isotretinoin and for 1 month after completing therapy. YES  Do not share medication with anyone. YES  Possible side effects discussed, including sun sensitivity, dry lips/eyes/skin, headaches, blurry vision (pseudotumor cerebri), muscle/joint aches, GI upset, bloody stools (“IBD” including Crohn’s/Ulcerative Colitis), jaundice, liver dysfunction, lipid abnormalities, bone marrow dysfunction, mood effects, thoughts of hurting oneself or others, and flaring of acne (acne fulminans/SAPPHO). YES  Females cannot get pregnant and must be on two forms of birth control while on therapy and at least one month after. YES  Report any side effects of mood swings or depression and stop medication upon occurrence. YES  Patient needs to confirm counseling in [place] prior to picking up prescription. YES    Scribe Attestation      I,:  ZULEYMA Xiong am acting as a scribe while in the presence of the attending physician.:       I,:  ZULEYMA Xiong personally performed the services described in this documentation    as scribed in my presence.:                   [1] No Known " "Allergies  [2]   Current Outpatient Medications:     ISOtretinoin (ACCUTANE) 20 MG capsule, Take 1 capsule (20 mg total) by mouth daily With 30 mg capsule (50 mg daily)., Disp: 30 capsule, Rfl: 0    ISOtretinoin (ACCUTANE) 30 MG capsule, Take 1 capsule (30 mg total) by mouth daily With 20 mg capsule (50 mg daily)., Disp: 30 capsule, Rfl: 0    ISOtretinoin (ACCUTANE) 40 MG capsule, Take 1 capsule (40 mg total) by mouth daily. Do NOT drink alcohol, share medication or donate blood. Do not become pregnant. Ipledge #: [id number], Disp: 30 capsule, Rfl: 0    albuterol (Ventolin HFA) 90 mcg/act inhaler, Inhale 2 puffs every 6 (six) hours as needed for wheezing, Disp: , Rfl:     azelastine (ASTELIN) 0.1 % nasal spray, 1 spray into each nostril 2 (two) times a day Use in each nostril as directed, Disp: 30 mL, Rfl: 1    ISOtretinoin (ACCUTANE) 20 MG capsule, Take 1 capsule (20 mg total) by mouth daily, Disp: 30 capsule, Rfl: 0    ISOtretinoin (ACCUTANE) 30 MG capsule, Take 1 capsule (30 mg total) by mouth daily, Disp: 30 capsule, Rfl: 0    ISOtretinoin (ACCUTANE) 40 MG capsule, Take 1 capsule (40 mg total) by mouth daily, Disp: 30 capsule, Rfl: 0    ketoconazole (NIZORAL) 2 % shampoo, DAILY FOR 2 WEEKS STRAIGHT AND THEN ON \"MONDAYS, WEDNESDAYS AND FRIDAYS\": LATHER INTO SCALP LEAVE ON FOR 5 MINUTES AND THEN RINSE OFF COMPLETELY., Disp: 120 mL, Rfl: 4    "

## 2025-07-03 ENCOUNTER — TELEMEDICINE (OUTPATIENT)
Dept: DERMATOLOGY | Facility: CLINIC | Age: 20
End: 2025-07-03
Payer: COMMERCIAL

## 2025-07-03 ENCOUNTER — TELEPHONE (OUTPATIENT)
Dept: DERMATOLOGY | Facility: CLINIC | Age: 20
End: 2025-07-03

## 2025-07-03 DIAGNOSIS — L70.0 ACNE VULGARIS: ICD-10-CM

## 2025-07-03 DIAGNOSIS — Z79.2 ON ACCUTANE THERAPY: Primary | ICD-10-CM

## 2025-07-03 PROCEDURE — 98006 SYNCH AUDIO-VIDEO EST MOD 30: CPT | Performed by: NURSE PRACTITIONER

## 2025-07-03 NOTE — TELEPHONE ENCOUNTER
Per in basket message-    ZULEYMA Musa sent to [facility]    Please call to schedule accutane monthly f/u      Called pt to get pt scheduled but n/a, left v/m with c/b info.

## 2025-07-03 NOTE — TELEPHONE ENCOUNTER
The below message was left for this PT in error    Called pt back to pls disregard the VM I left previously

## 2025-07-03 NOTE — PROGRESS NOTES
Virtual Regular Visit  Name: Jeri Thomas      : 2005      MRN: [id number]  Encounter Provider: ZULEYMA Musa  Encounter Date: 7/3/2025   Encounter department: [facility] DERMATOLOGY [facility]  :  Assessment & Plan    Acne vulgaris         On isotretinoin therapy         Xerosis of skin         High risk medication use               History of Present Illness     HPI  Review of Systems    Objective   There were no vitals taken for this visit.    Physical Exam    Administrative Statements   Encounter provider ZULEYMA Musa    The Patient is located at Home and in the following state in which I hold an active license [place].    The patient was identified by name and date of birth. Jeri Thomas was informed that this is a telemedicine visit and that the visit is being conducted through the [employer] platform. She agrees to proceed..  My office door was closed. No one else was in the room.  She acknowledged consent and understanding of privacy and security of the video platform. The patient has agreed to participate and understands they can discontinue the visit at any time.    I have spent a total time of 10 minutes in caring for this patient on the day of the visit/encounter including Risks and benefits of tx options, Instructions for management, Patient and family education, Counseling / Coordination of care, Documenting in the medical record, and Obtaining or reviewing history  , not including the time spent for establishing the audio/video connection.    [facility] Dermatology Clinic Note     Patient Name: Jeri Thomas  Encounter Date: 7/3/2025       Have you been cared for by a [facility] Dermatologist in the last 3 years and, if so, which description applies to you? Yes. I have been here within the last 3 years, and my medical history has NOT changed since that time. I am of child-bearing potential.     REVIEW OF SYSTEMS:  Have you recently had or currently have any of the  "following? No changes in my recent health.   PAST MEDICAL HISTORY:  Have you personally ever had or currently have any of the following?  If \"YES,\" then please provide more detail. No changes in my medical history.   HISTORY OF IMMUNOSUPPRESSION: Do you have a history of any of the following:  Systemic Immunosuppression such as Diabetes, Biologic or Immunotherapy, Chemotherapy, Organ Transplantation, Bone Marrow Transplantation or Prednisone?  No     Answering \"YES\" requires the addition of the dotphrase \"IMMUNOSUPPRESSED\" as the first diagnosis of the patient's visit.   FAMILY HISTORY:  Any \"first degree relatives\" (parent, brother, sister, or child) with the following?    No changes in my family's known health.   PATIENT EXPERIENCE:    If necessary, do we have your permission to call and leave a detailed message on your Preferred Phone number that includes your specific medical information?  Yes      Allergies[1] Current Medications[2]        Whom besides the patient is providing clinical information about today's encounter?   NO ADDITIONAL HISTORIAN (patient alone provided history)    Physical Exam and Assessment/Plan by Diagnosis:    ISOTRETINOIN \"ACCUTANE\": FEMALE     Age: 19 y.o.  Weight (in KILOgrams): 56     Ipledge number: [id number]     Contraception Type 1: implantable hormone  Contraception Type 2: male latex condom  Patient reminded that this must be listed in same order on iPledge. Yes         \"Goal Dose\" in mg (125 mg x weight in kg): 7,000 mg     Interim month  \"Daily Dose\" of Isotretinoin the patient has actually been taking since last visit (this is usually what was prescribed): 30 mg  \"Total # of Days\" patient took Isotretinoin since last visit (this is usually 30):  30 days  \"Total Monthly Dose\" in mg since last visit (this equals \"Daily Dose\" x \"Total # of Days\"): 900 mg     Cumulative dosage  \"Total cumulative Dose\" in mg (add the above \"Total Monthly Dose\" with \"Total Cumulative Dose\" from " "last visit: 4,800 mg           Symptoms  Conjunctivitis: No  Night Blindness/ Issues with night vision: No  Focusing Problems: No  Dry Lips/Eyes: YES  Dry Skin: YES  Rash: No  Nose Bleeds: No  Angular cheilitis (cracking in corner of lips): No  Headaches: No  Photosensitivity: No  Dry Anus: No  Depression: No  Mood Changes: No  Suicidal thoughts: No  Fatigue: No  Weight Loss: No  Muscle Pain/Stiffness: No  Abdominal pain: No  Diarrhea: No  Bloody stools: No     Physical Exam  Psychiatric/Mood: normal, pleasant   Anatomic Location Affected:  face  Morphological Description:  Open/Closed Comedones:  Rare (\"Almost Clear\")  Inflammatory Papules/Pustules:  Rare (\"Almost Clear\")  Nodules:  Clear   Scarring:  Rare (\"Almost Clear\")  Excoriations:  No evidence (\"Clear\")  Local Skin Redness/Erythema:  Clear   Local Skin Dryness/Scaling:  Rare (\"Almost Clear\")  Local Skin Dyspigmentation:  No evidence (\"Clear\")        Labs  Date of last labs: 1/3/25  Completed: YES  Labs reviewed: within normal limits  Pregnancy test: urine pregnancy at home   Result: PENDING  Interpretation- pregnant: RESULTS PENDING        Additional History of Present Condition:  patient presents for Accutane f/u.  Last evaluated by jerica gordon on 6/4/25.  She was unable to  50 mg from her pharmacy, and they could only dispense 30 mg daily.  She denies any acne flares and tolerates it well.  Patient has IUD in place.     DIAGNOSES:        Acne Vulgaris  High Risk Medication  Medication Monitoring  Xerosis (see below for patient education)     Assessment and Plan:  Target Total Dose per KILOgram:  125 mg/KILOgram (this may change this on a patient-by-patient basis)  Planned daily dose for next 30 days: 60 mg daily   (please remember to add patient's \"Ipledge number\" to actual prescription):  [id number]  Return to clinic in about 1 month, please review ipledge guidelines in terms of timing (see below for patient education)  Get labs 1-2 days " before next appointment: NO, will order at next visit  Patient confirmed in iPledge: NO, she will send photo of pregnancy test through "[employer]", and will confirm once reviewed  Sun protection  Patients counseled:  Cannot donate blood while taking isotretinoin and for 1 month after completing therapy. YES  Do not share medication with anyone. YES  Possible side effects discussed, including sun sensitivity, dry lips/eyes/skin, headaches, blurry vision (pseudotumor cerebri), muscle/joint aches, GI upset, bloody stools (“IBD” including Crohn’s/Ulcerative Colitis), jaundice, liver dysfunction, lipid abnormalities, bone marrow dysfunction, mood effects, thoughts of hurting oneself or others, and flaring of acne (acne fulminans/SAPPHO). YES  Females cannot get pregnant and must be on two forms of birth control while on therapy and at least one month after. YES  Report any side effects of mood swings or depression and stop medication upon occurrence. YES  Patient needs to confirm counseling in [employer] prior to picking up prescription. YES    Scribe Attestation      I,:   am acting as a scribe while in the presence of the attending physician.:       I,:   personally performed the services described in this documentation    as scribed in my presence.:                   [1] No Known Allergies  [2]   Current Outpatient Medications:     ISOtretinoin (ACCUTANE) 40 MG capsule, Take 1 capsule (40 mg total) by mouth daily. Do NOT drink alcohol, share medication or donate blood. Do not become pregnant. Ipledge #: [id number], Disp: 30 capsule, Rfl: 0    albuterol (Ventolin HFA) 90 mcg/act inhaler, Inhale 2 puffs every 6 (six) hours as needed for wheezing, Disp: , Rfl:     azelastine (ASTELIN) 0.1 % nasal spray, 1 spray into each nostril 2 (two) times a day Use in each nostril as directed, Disp: 30 mL, Rfl: 1    ISOtretinoin (ACCUTANE) 20 MG capsule, Take 1 capsule (20 mg total) by mouth daily, Disp: 30 capsule, Rfl: 0    ISOtretinoin  "(ACCUTANE) 20 MG capsule, Take 1 capsule (20 mg total) by mouth daily With 30 mg capsule (50 mg daily)., Disp: 30 capsule, Rfl: 0    ISOtretinoin (ACCUTANE) 30 MG capsule, Take 1 capsule (30 mg total) by mouth daily, Disp: 30 capsule, Rfl: 0    ISOtretinoin (ACCUTANE) 30 MG capsule, Take 1 capsule (30 mg total) by mouth daily With 20 mg capsule (50 mg daily)., Disp: 30 capsule, Rfl: 0    ISOtretinoin (ACCUTANE) 40 MG capsule, Take 1 capsule (40 mg total) by mouth daily, Disp: 30 capsule, Rfl: 0    ketoconazole (NIZORAL) 2 % shampoo, DAILY FOR 2 WEEKS STRAIGHT AND THEN ON \"MONDAYS, WEDNESDAYS AND FRIDAYS\": LATHER INTO SCALP LEAVE ON FOR 5 MINUTES AND THEN RINSE OFF COMPLETELY., Disp: 120 mL, Rfl: 4    "

## 2025-07-03 NOTE — TELEPHONE ENCOUNTER
LVSHELBI re:virtual visit that had been scheduled @[facility] PT stated she was in [place] so visit was cancelled    Informed PT to be seen virtually she would need to be seen by provider in [place], or can be seen in office in either [place] or [place]    Soonest currently is Mon, 7/7/25@9:20am@[place] location

## 2025-07-07 DIAGNOSIS — Z79.2 ON ISOTRETINOIN THERAPY: ICD-10-CM

## 2025-07-07 DIAGNOSIS — Z79.899 HIGH RISK MEDICATION USE: ICD-10-CM

## 2025-07-07 DIAGNOSIS — L70.0 ACNE VULGARIS: ICD-10-CM

## 2025-07-07 RX ORDER — ISOTRETINOIN 30 MG/1
60 CAPSULE ORAL DAILY
Qty: 60 CAPSULE | Refills: 0 | Status: SHIPPED | OUTPATIENT
Start: 2025-07-07 | End: 2025-08-06

## 2025-08-07 ENCOUNTER — TELEMEDICINE (OUTPATIENT)
Age: 20
End: 2025-08-07

## 2025-08-07 DIAGNOSIS — Z79.2 ON ISOTRETINOIN THERAPY: ICD-10-CM

## 2025-08-07 DIAGNOSIS — Z79.899 HIGH RISK MEDICATION USE: ICD-10-CM

## 2025-08-07 DIAGNOSIS — L85.3 XEROSIS OF SKIN: ICD-10-CM

## 2025-08-07 DIAGNOSIS — L70.0 ACNE VULGARIS: Primary | ICD-10-CM

## 2025-08-07 RX ORDER — ISOTRETINOIN 40 MG/1
40 CAPSULE ORAL DAILY
Qty: 30 CAPSULE | Refills: 0 | Status: SHIPPED | OUTPATIENT
Start: 2025-08-07 | End: 2025-09-06